# Patient Record
Sex: FEMALE | Race: WHITE | Employment: UNEMPLOYED | URBAN - METROPOLITAN AREA
[De-identification: names, ages, dates, MRNs, and addresses within clinical notes are randomized per-mention and may not be internally consistent; named-entity substitution may affect disease eponyms.]

---

## 2017-02-20 ENCOUNTER — DOCUMENTATION ONLY (OUTPATIENT)
Dept: NEUROLOGY | Age: 57
End: 2017-02-20

## 2017-02-20 NOTE — PROGRESS NOTES
Received PA request from [employer] for Fycompa 2 mg tab. PA request faxed to [facility]. Fax confirmed.

## 2017-03-07 RX ORDER — CARBAMAZEPINE 300 MG/1
CAPSULE, EXTENDED RELEASE ORAL
Qty: 180 CAP | Refills: 4 | Status: SHIPPED | OUTPATIENT
Start: 2017-03-07 | End: 2017-07-25 | Stop reason: SDUPTHER

## 2017-03-20 ENCOUNTER — TELEPHONE (OUTPATIENT)
Dept: NEUROLOGY | Age: 57
End: 2017-03-20

## 2017-03-20 NOTE — TELEPHONE ENCOUNTER
Pt calling in re to a [facility] got [facility] re to her [facility]. Please give her a call back.

## 2017-03-20 NOTE — TELEPHONE ENCOUNTER
Spoke with patient. She stated that she received a letter from [facility] stating that Sheral Payer is not covered. Patient wanted to know what she should do about this. Writer told patient, will reach out to PA coordinator to help with this. Patient stated she is doing really well on Fycompa and does not want it taken away.

## 2017-03-22 ENCOUNTER — TELEPHONE (OUTPATIENT)
Dept: NEUROLOGY | Age: 57
End: 2017-03-22

## 2017-03-23 NOTE — TELEPHONE ENCOUNTER
Pt calling again in re to check the status of her PA. Pt she said she has until today to get her medication Fycompa. Pt was very upset because she is worried it will not be filled before she runs out. Please give her a call back as soon as possible.

## 2017-03-23 NOTE — TELEPHONE ENCOUNTER
Spoke with patient. She raised her voice again at this nurse and stated \"Latrice will not be there tomorrow so I need something done today\". Writer asked if she spoke with Tomy Chandler and she stated that she had not but she \"called her phone\" and was told that she Dyana Mata) was not going to be in the office tomorrow. Writer again stated to the patient that I have reached out to Tomy Chandler to look into this issue but I do not have an update at this time. Patient then stated \"Okay, I'll just keep calling Latrice then\" and ended the phone conversation.

## 2017-03-23 NOTE — TELEPHONE ENCOUNTER
Spoke with patient. She raised her voice and stated \"the pharmacist will not be giving me anymore Fycompa after Friday and I'm running out\". Writer advised that I have reached out to our PA coordinator but have not received an update as of yet. Writer told patient, will reach out again and will call patient with any updates. She stated understanding.

## 2017-03-31 ENCOUNTER — OFFICE VISIT (OUTPATIENT)
Dept: NEUROLOGY | Age: 57
End: 2017-03-31

## 2017-03-31 ENCOUNTER — HOSPITAL ENCOUNTER (EMERGENCY)
Age: 57
Discharge: HOME | End: 2017-03-31
Attending: EMERGENCY MEDICINE | Admitting: EMERGENCY MEDICINE
Payer: MEDICARE

## 2017-03-31 VITALS
SYSTOLIC BLOOD PRESSURE: 180 MMHG | OXYGEN SATURATION: 97 % | RESPIRATION RATE: 18 BRPM | WEIGHT: 171 LBS | HEART RATE: 95 BPM | BODY MASS INDEX: 28.49 KG/M2 | HEIGHT: 65 IN | DIASTOLIC BLOOD PRESSURE: 96 MMHG

## 2017-03-31 VITALS
OXYGEN SATURATION: 97 % | WEIGHT: 167.13 LBS | RESPIRATION RATE: 16 BRPM | TEMPERATURE: 98.9 F | DIASTOLIC BLOOD PRESSURE: 71 MMHG | HEIGHT: 65 IN | BODY MASS INDEX: 27.85 KG/M2 | SYSTOLIC BLOOD PRESSURE: 177 MMHG | HEART RATE: 95 BPM

## 2017-03-31 DIAGNOSIS — R41.89 COGNITIVE IMPAIRMENT: ICD-10-CM

## 2017-03-31 DIAGNOSIS — G40.219 LOCALIZATION-RELATED SYMPTOMATIC EPILEPSY AND EPILEPTIC SYNDROMES WITH COMPLEX PARTIAL SEIZURES, INTRACTABLE, WITHOUT STATUS EPILEPTICUS (HCC): Primary | ICD-10-CM

## 2017-03-31 DIAGNOSIS — I10 ESSENTIAL HYPERTENSION: Primary | ICD-10-CM

## 2017-03-31 LAB
ALBUMIN SERPL BCP-MCNC: 3.8 G/DL (ref 3.5–5)
ALBUMIN/GLOB SERPL: 1 {RATIO} (ref 1.1–2.2)
ALP SERPL-CCNC: 101 U/L (ref 45–117)
ALT SERPL-CCNC: 25 U/L (ref 12–78)
ANION GAP BLD CALC-SCNC: 10 MMOL/L (ref 5–15)
APPEARANCE UR: CLEAR
AST SERPL W P-5'-P-CCNC: 8 U/L (ref 15–37)
ATRIAL RATE: 95 BPM
BACTERIA URNS QL MICRO: NEGATIVE /HPF
BASOPHILS # BLD AUTO: 0 K/UL (ref 0–0.1)
BASOPHILS # BLD: 0 % (ref 0–1)
BILIRUB SERPL-MCNC: 0.2 MG/DL (ref 0.2–1)
BILIRUB UR QL: NEGATIVE
BUN SERPL-MCNC: 15 MG/DL (ref 6–20)
BUN/CREAT SERPL: 23 (ref 12–20)
CALCIUM SERPL-MCNC: 9 MG/DL (ref 8.5–10.1)
CALCULATED P AXIS, ECG09: 51 DEGREES
CALCULATED R AXIS, ECG10: -23 DEGREES
CALCULATED T AXIS, ECG11: 86 DEGREES
CHLORIDE SERPL-SCNC: 99 MMOL/L (ref 97–108)
CO2 SERPL-SCNC: 30 MMOL/L (ref 21–32)
COLOR UR: ABNORMAL
CREAT SERPL-MCNC: 0.66 MG/DL (ref 0.55–1.02)
DIAGNOSIS, 93000: NORMAL
EOSINOPHIL # BLD: 0.1 K/UL (ref 0–0.4)
EOSINOPHIL NFR BLD: 2 % (ref 0–7)
EPITH CASTS URNS QL MICRO: ABNORMAL /LPF
ERYTHROCYTE [DISTWIDTH] IN BLOOD BY AUTOMATED COUNT: 12.3 % (ref 11.5–14.5)
GLOBULIN SER CALC-MCNC: 4 G/DL (ref 2–4)
GLUCOSE SERPL-MCNC: 245 MG/DL (ref 65–100)
GLUCOSE UR STRIP.AUTO-MCNC: >1000 MG/DL
HCT VFR BLD AUTO: 42.3 % (ref 35–47)
HGB BLD-MCNC: 14.2 G/DL (ref 11.5–16)
HGB UR QL STRIP: NEGATIVE
HYALINE CASTS URNS QL MICRO: ABNORMAL /LPF (ref 0–5)
KETONES UR QL STRIP.AUTO: NEGATIVE MG/DL
LEUKOCYTE ESTERASE UR QL STRIP.AUTO: NEGATIVE
LYMPHOCYTES # BLD AUTO: 24 % (ref 12–49)
LYMPHOCYTES # BLD: 2 K/UL (ref 0.8–3.5)
MCH RBC QN AUTO: 29.5 PG (ref 26–34)
MCHC RBC AUTO-ENTMCNC: 33.6 G/DL (ref 30–36.5)
MCV RBC AUTO: 87.9 FL (ref 80–99)
MONOCYTES # BLD: 0.6 K/UL (ref 0–1)
MONOCYTES NFR BLD AUTO: 7 % (ref 5–13)
NEUTS SEG # BLD: 5.6 K/UL (ref 1.8–8)
NEUTS SEG NFR BLD AUTO: 67 % (ref 32–75)
NITRITE UR QL STRIP.AUTO: NEGATIVE
P-R INTERVAL, ECG05: 166 MS
PH UR STRIP: 5.5 [PH] (ref 5–8)
PLATELET # BLD AUTO: 341 K/UL (ref 150–400)
POTASSIUM SERPL-SCNC: 3.8 MMOL/L (ref 3.5–5.1)
PROT SERPL-MCNC: 7.8 G/DL (ref 6.4–8.2)
PROT UR STRIP-MCNC: ABNORMAL MG/DL
Q-T INTERVAL, ECG07: 362 MS
QRS DURATION, ECG06: 104 MS
QTC CALCULATION (BEZET), ECG08: 454 MS
RBC # BLD AUTO: 4.81 M/UL (ref 3.8–5.2)
RBC #/AREA URNS HPF: ABNORMAL /HPF (ref 0–5)
SODIUM SERPL-SCNC: 139 MMOL/L (ref 136–145)
SP GR UR REFRACTOMETRY: 1.02 (ref 1–1.03)
UROBILINOGEN UR QL STRIP.AUTO: 0.2 EU/DL (ref 0.2–1)
VENTRICULAR RATE, ECG03: 95 BPM
WBC # BLD AUTO: 8.4 K/UL (ref 3.6–11)
WBC URNS QL MICRO: ABNORMAL /HPF (ref 0–4)

## 2017-03-31 PROCEDURE — 80053 COMPREHEN METABOLIC PANEL: CPT | Performed by: EMERGENCY MEDICINE

## 2017-03-31 PROCEDURE — 36415 COLL VENOUS BLD VENIPUNCTURE: CPT | Performed by: EMERGENCY MEDICINE

## 2017-03-31 PROCEDURE — 99285 EMERGENCY DEPT VISIT HI MDM: CPT

## 2017-03-31 PROCEDURE — 81001 URINALYSIS AUTO W/SCOPE: CPT | Performed by: EMERGENCY MEDICINE

## 2017-03-31 PROCEDURE — 85025 COMPLETE CBC W/AUTO DIFF WBC: CPT | Performed by: EMERGENCY MEDICINE

## 2017-03-31 PROCEDURE — 74011250637 HC RX REV CODE- 250/637: Performed by: EMERGENCY MEDICINE

## 2017-03-31 PROCEDURE — 93005 ELECTROCARDIOGRAM TRACING: CPT

## 2017-03-31 RX ORDER — HYDRALAZINE HYDROCHLORIDE 25 MG/1
25 TABLET, FILM COATED ORAL 2 TIMES DAILY
Qty: 60 TAB | Refills: 0 | Status: SHIPPED | OUTPATIENT
Start: 2017-03-31 | End: 2019-12-02

## 2017-03-31 RX ORDER — HYDRALAZINE HYDROCHLORIDE 50 MG/1
25 TABLET, FILM COATED ORAL
Status: COMPLETED | OUTPATIENT
Start: 2017-03-31 | End: 2017-03-31

## 2017-03-31 RX ADMIN — HYDRALAZINE HYDROCHLORIDE 25 MG: 50 TABLET, FILM COATED ORAL at 18:01

## 2017-03-31 NOTE — PROGRESS NOTES
Neurology Progress Note    HISTORY PROVIDED BY: patient and friend    Chief Complaint:   Chief Complaint   Patient presents with    Seizure      Subjective:   Pt is a 60 y.o. left handed female with focal onset epilepsy with h/o left MCA CVA on 7/17/13 with partial aphasia, right facial weakness and right upper extremity weakness, with persistent difficulties with language. Pt had risk factors of uncontrolled HTN, DM, hypercholesterolemia, and medical noncompliance- all improved. She had new onset seizure 2/21/14, probable focal onset seizure with secondary generalization with left hemisphere onset, and stroke is likely epileptogenic focus. Has also had a cluster of focal onset seizures without DEMAR/LOC. Was having ongoing breakthrough spells that sounded unusual despite escalating doses of AEDs. Now reporting good seizure control since August. Exam with mild expressive aphasia. Continued Carbamazepine /900mg and Fycompa 8mg every day. Consider EMU evaluation if has recurrent breakthrough seizures. Continued Effexor XR 75mg every day. Continued Plavix 75mg every day. Recommended evaluation by her ophthalmologist for monocular diplopia. She returns for f/u. She denies any seizures since she called the clinic in January reporting one breakthrough seizure in setting of stress and sleep deprivation. AEDs have not changed. Her friend and pt note that she has been sweating excessively recently, denies CP or SOB. BP today is very elevated. Reports compliance with her meds. Has not discussed excessive sweating with her PCP. She saw her ophthalmologist and is being treated for dry eye, may need a prism.          Past Medical History:   Diagnosis Date    Anxiety     anxiety    [facility]     Hypercholesterolemia     Hypertension     Localization-related symptomatic epilepsy and epileptic syndromes with complex partial seizures, intractable, without status epilepticus ([facility]     Stroke ([facility]     Left MCA HARRIETT 2013      Past Surgical History:   Procedure Laterality Date    HX  SECTION      x 3    HX GYN      2 masses removed from ovaries, noncancerous    HX HERNIA REPAIR      HX HYSTERECTOMY        Social History     Social History    Marital status:      Spouse name: N/A    Number of children: N/A    Years of education: N/A     Occupational History    Retired      Social History Main Topics    Smoking status: Never Smoker    Smokeless tobacco: Never Used    Alcohol use No    Drug use: No    Sexual activity: Not on file     Other Topics Concern    Not on file     Social History Narrative    Lives with a roommate     Family History   Problem Relation Age of Onset    Adopted: Yes          Objective:   ROS:  Per HPI-  Otherwise 10 point ROS was negative    Allergies   Allergen Reactions    Aspirin Swelling     Periorbital swelling    Codeine Angioedema    Nsaids (Non-Steroidal Anti-Inflammatory Drug) Nausea and Vomiting    Pcn [Penicillins] Swelling       Meds:  Outpatient Medications Prior to Visit   Medication Sig Dispense Refill    carBAMazepine ER (CARBATROL ER) 300 mg capsule TAKE 3 CAPSULES BY MOUTH 2 TIMES A  Cap 4    venlafaxine-SR (EFFEXOR-XR) 75 mg capsule Take 1 Cap by mouth daily. 11    FYCOMPA 2 mg tablet Take 4 Tabs by mouth every evening. Max Daily Amount: 8 mg. 120 Tab 5    verapamil CR (VERELAN) 360 mg CR capsule Take 360 mg by mouth daily.  LORazepam (ATIVAN) 0.5 mg tablet Take 2 Tabs by mouth every eight (8) hours as needed for Anxiety. Max Daily Amount: 3 mg. 15 Tab 0    atorvastatin (LIPITOR) 40 mg tablet Take 40 mg by mouth nightly.  metFORMIN ER (GLUCOPHAGE XR) 500 mg tablet Take 2,000 mg by mouth daily (with dinner).  valsartan-hydrochlorothiazide (DIOVAN-HCT) 320-25 mg per tablet Take 1 Tab by mouth daily. Indications: HYPERTENSION 30 Tab 0    clopidogrel (PLAVIX) 75 mg tablet Take 1 Tab by mouth daily.  30 Tab 1    insulin glargine (LANTUS SOLOSTAR) 100 unit/mL (3 mL) pen 15 Units by SubCUTAneous route nightly. No facility-administered medications prior to visit. Imaging:  MRI Results (most recent):    Results from Hospital Encounter encounter on 02/21/14   MRA BRAIN WO CONT   Narrative **Final Report**      ICD Codes / Adm. Diagnosis: 435.9  [id number] / Unspecified transient cerebral    Urinary Incontinence  Examination:  MR HEAD MRA WO CON  - [id number] - Feb 21 2014  2:34PM  Accession No:  [id number]  Reason:        REPORT:  INDICATION:  Right facial droop    EXAMINATION:  MRI BRAIN WITH/WITHOUT CONTRAST, MRA HEAD WO CONTRAST    COMPARISON:  CT head earlier today    TECHNIQUE:  MR imaging of the brain was performed with sagittal T1, axial   T1, T2, FLAIR, GRE, DWI/ADC; pre and post contrast multiplanar T1 utilizing   7 mL Gadavist.  3 D time of flight MRA of the head was performed. FINDINGS:    The ventricles are midline without hydrocephalus. There is no acute intra   or extra-axial fluid collection. There is encephalomalacia in the anterior   left middle cerebral artery territory, with associated left sided while area   and degeneration. There is a punctate remote lacunar infarction within the   left cerebellum. There is no acute infarction. The major intracranial   vascular flow-voids are patent. There is no abnormal parenchymal or   meningeal enhancement. Basilar artery is diminutive due to bilateral posterior communicating   arteries. There is mild irregularity of the bilateral posterior cerebral   arteries. Internal carotid arteries are patent. There is focal irregularity   and narrowing at the left distal M1 segment, with minimal flow in the   anterior division of the left middle cerebral artery ranges. There is also   mild irregularity throughout the bilateral anterior cerebral arteries. There   is no aneurysm.        IMPRESSION:  Encephalomalacia anterior left middle cerebral artery territory with   associated left sided Wallerian degeneration. Diminished flow anterior   division left MCA branches, felt to be chronic. No acute infarction. Signing/Reading Doctor: Maribell Burgos ([id number])    Approved: Maribell Burgos ([id number])  Feb 21 2014  3:35PM                                    CT Results (most recent):    Results from Hospital Encounter encounter on 01/04/15   CT CODE NEURO HEAD WO CONTRAST   Narrative **Final Report**      ICD Codes / Adm. Diagnosis: [phone] / Stroke  STROKE  Examination:  CT CODE NEURO HEAD WO CON  - [id number] - Jan 4 2015  9:08PM  Accession No:  [id number]  Reason:  aphasia, right droop      REPORT:  INDICATION: Stroke. Aphasia. FINDINGS: 5 mm axial images were obtained from the skull base through the   vertex. The ventricles and cortical sulci are appropriate in size and   configuration. There is stable encephalomalacia in the left frontal lobe. There is no evidence of intracranial hemorrhage, mass, mass effect, or acute   infarct. No extra-axial fluid collections are seen. The visualized paranasal   sinuses and mastoid air cells are clear. The orbital structures are   unremarkable. No osseous abnormalities are seen. IMPRESSION: Stable left frontal lobe encephalomalacia, compatible with old   infarct. No evidence of acute infarct, intracranial hemorrhage, or   intracranial mass. Signing/Reading Doctor: Palma Pozo ([id number])    Approved: Palma Pozo ([id number])  Jan 4 2015  9:19PM                                      Reviewed records in [facility] and media tab today    Lab Review   Results for orders placed or performed in visit on 10/03/16   CARBAMAZEPINE   Result Value Ref Range    Carbamazepine 11.8 4.0 - 12.0 ug/mL        Exam:  Visit Vitals    BP (!) 180/96    Pulse 95    Resp 18    Ht 5' 5\" (1.651 m)    Wt 77.6 kg (171 lb)    SpO2 97%    BMI 28.46 kg/m2   BP recheck was 180/100. General:  Alert, cooperative, no distress. Diaphoretic. Tremulous. Head:  Normocephalic, without obvious abnormality, atraumatic. Respiratory:  Heart:   Non labored breathing  Regular rhythm, tachycardic,  no murmurs       Extremities: Warm, no cyanosis or edema. Pulses: 2+ radial pulses. Neurologic:  MS: Alert and oriented x 4, speech intact. Language - word finding. Attention and fund of knowledge appropriate. Recent and remote memory intact. Cranial Nerves:  II: visual fields Full to confrontation   II: pupils Equal, round, reactive to light   II: optic disc    III,VII: ptosis none   III,IV,VI: extraocular muscles  EOMI, no nystagmus or diplopia   V: facial light touch sensation     VII: facial muscle function   symmetric   VIII: hearing intact   IX: soft palate elevation  normal   XI: trapezius strength     XI: sternocleidomastoid strength    XII: tongue  Midline     Motor: normal bulk and tone, no tremor              Strength: 5/5 throughout, no PD    Coordination: FTN and MARIA GUADALUPE intact  Gait: normal gait  Reflexes: 2+ symmetric           Assessment/Plan   Pt is a 60 y.o. left handed female with focal onset epilepsy with h/o left MCA CVA on 7/17/13 with partial aphasia, right facial weakness and right upper extremity weakness, with persistent difficulties with language. Pt had risk factors of uncontrolled HTN, DM, hypercholesterolemia, and medical noncompliance, now compliant with meds. She had new onset seizure 2/21/14, probable focal onset seizure with secondary generalization with left hemisphere onset, and stroke is likely epileptogenic focus. Has also had a cluster of focal onset seizures without DEMAR/LOC. Seizures have been well controlled since August. Exam with elevated /100 and diaphoresis, mild expressive aphasia. Continue Carbamazepine /900mg and Fycompa 8mg every day. Continue Effexor XR 75mg every day. Continue Plavix 75mg every day.   Pt called her PCP regarding BP to see if she could be seen this afternoon, given history of CVA and multiple CV risk factors. They recommended she go to the ED now. She will f/u in clinic in six months, instructed to call in the interim if needed. ICD-10-CM ICD-9-CM    1. Localization-related symptomatic epilepsy and epileptic syndromes with complex partial seizures, intractable, without status epilepticus [facility] B02.199 341.01 METABOLIC PANEL, COMPREHENSIVE      CBC WITH AUTOMATED DIFF       Signed:   Amanda Gomes MD  3/31/2017

## 2017-03-31 NOTE — ED TRIAGE NOTES
Pt states that she went to her Neurologist for her three month appointment, they told her BP was elevated 180/100, pt was told to call her PCP, which she did and they instructed her to come to the ER. Pt denies chest pain and SOB. Pt states that she does have a HX of HTN and takes medication for it and has been taking it normally. Pt denies NVD.

## 2017-03-31 NOTE — DISCHARGE INSTRUCTIONS
High Blood Pressure: Care Instructions  Your Care Instructions  If your blood pressure is usually above 140/90, you have high blood pressure, or hypertension. That means the top number is 140 or higher or the bottom number is 90 or higher, or both. Despite what a lot of people think, high blood pressure usually doesn't cause headaches or make you feel dizzy or lightheaded. It usually has no symptoms. But it does increase your risk for heart attack, stroke, and kidney or eye damage. The higher your blood pressure, the more your risk increases. Your doctor will give you a goal for your blood pressure. Your goal will be based on your health and your age. An example of a goal is to keep your blood pressure below 140/90. Lifestyle changes, such as eating healthy and being active, are always important to help lower blood pressure. You might also take medicine to reach your blood pressure goal.  Follow-up care is a key part of your treatment and safety. Be sure to make and go to all appointments, and call your doctor if you are having problems. It's also a good idea to know your test results and keep a list of the medicines you take. How can you care for yourself at home? Medical treatment  · If you stop taking your medicine, your blood pressure will go back up. You may take one or more types of medicine to lower your blood pressure. Be safe with medicines. Take your medicine exactly as prescribed. Call your doctor if you think you are having a problem with your medicine. · Talk to your doctor before you start taking aspirin every day. Aspirin can help certain people lower their risk of a heart attack or stroke. But taking aspirin isn't right for everyone, because it can cause serious bleeding. · See your doctor regularly. You may need to see the doctor more often at first or until your blood pressure comes down.   · If you are taking blood pressure medicine, talk to your doctor before you take decongestants or anti-inflammatory medicine, such as ibuprofen. Some of these medicines can raise blood pressure. · Learn how to check your blood pressure at home. Lifestyle changes  · Stay at a healthy weight. This is especially important if you put on weight around the waist. Losing even 10 pounds can help you lower your blood pressure. · If your doctor recommends it, get more exercise. Walking is a good choice. Bit by bit, increase the amount you walk every day. Try for at least 30 minutes on most days of the week. You also may want to swim, bike, or do other activities. · Avoid or limit alcohol. Talk to your doctor about whether you can drink any alcohol. · Try to limit how much sodium you eat to less than 2,300 milligrams (mg) a day. Your doctor may ask you to try to eat less than 1,500 mg a day. · Eat plenty of fruits (such as bananas and oranges), vegetables, legumes, whole grains, and low-fat dairy products. · Lower the amount of saturated fat in your diet. Saturated fat is found in animal products such as milk, cheese, and meat. Limiting these foods may help you lose weight and also lower your risk for heart disease. · Do not smoke. Smoking increases your risk for heart attack and stroke. If you need help quitting, talk to your doctor about stop-smoking programs and medicines. These can increase your chances of quitting for good. When should you call for help? Call 911 anytime you think you may need emergency care. This may mean having symptoms that suggest that your blood pressure is causing a serious heart or blood vessel problem. Your blood pressure may be over 180/110. For example, call 911 if:  · You have symptoms of a heart attack. These may include:  ¨ Chest pain or pressure, or a strange feeling in the chest.  ¨ Sweating. ¨ Shortness of breath. ¨ Nausea or vomiting. ¨ Pain, pressure, or a strange feeling in the back, neck, jaw, or upper belly or in one or both shoulders or arms.   ¨ Lightheadedness or sudden weakness. ¨ A fast or irregular heartbeat. · You have symptoms of a stroke. These may include:  ¨ Sudden numbness, tingling, weakness, or loss of movement in your face, arm, or leg, especially on only one side of your body. ¨ Sudden vision changes. ¨ Sudden trouble speaking. ¨ Sudden confusion or trouble understanding simple statements. ¨ Sudden problems with walking or balance. ¨ A sudden, severe headache that is different from past headaches. · You have severe back or belly pain. Do not wait until your blood pressure comes down on its own. Get help right away. Call your doctor now or seek immediate care if:  · Your blood pressure is much higher than normal (such as 180/110 or higher), but you don't have symptoms. · You think high blood pressure is causing symptoms, such as:  ¨ Severe headache. ¨ Blurry vision. Watch closely for changes in your health, and be sure to contact your doctor if:  · Your blood pressure measures 140/90 or higher at least 2 times. That means the top number is 140 or higher or the bottom number is 90 or higher, or both. · You think you may be having side effects from your blood pressure medicine. · Your blood pressure is usually normal, but it goes above normal at least 2 times. Where can you learn more? Go to http://hansel-jose carlos.info/. Enter G879 in the search box to learn more about \"High Blood Pressure: Care Instructions. \"  Current as of: August 8, 2016  Content Version: 11.2  © 9387-3995 [employer]. Care instructions adapted under license by [employer] (which disclaims liability or warranty for this information). If you have questions about a medical condition or this instruction, always ask your healthcare professional. [place] [address] any warranty or liability for your use of this information. We hope that we have addressed all of your medical concerns.  The examination and treatment you received in the Emergency Department were for an emergent problem and were not intended as complete care. It is important that you follow up with your healthcare provider(s) for ongoing care. If your symptoms worsen or do not improve as expected, and you are unable to reach your usual health care provider(s), you should return to the Emergency Department. Today's healthcare is undergoing tremendous change, and patient satisfaction surveys are one of the many tools to assess the quality of medical care. You may receive a survey from the [employer] regarding your experience in the Emergency Department. I hope that your experience has been completely positive, particularly the medical care that I provided. As such, please participate in the survey; anything less than excellent does not meet my expectations or intentions. [facility] and [facility] participate in nationally recognized quality of care measures. If your blood pressure is greater than 120/80, as reported below, we urge that you seek medical care to address the potential of high blood pressure, commonly known as hypertension. Hypertension can be hereditary or can be caused by certain medical conditions, pain, stress, or \"white coat syndrome. \"       Please make an appointment with your health care provider(s) for follow up of your Emergency Department visit. VITALS:   Patient Vitals for the past 8 hrs:   Temp Pulse Resp BP SpO2   03/31/17 1801 - 84 - 155/77 -   03/31/17 1640 - 93 19 173/76 97 %   03/31/17 1612 - 97 16 (!) 180/92 97 %   03/31/17 1553 98.9 °F (37.2 °C) (!) 102 18 (!) 188/99 97 %          Thank you for allowing us to provide you with medical care today. We realize that you have many choices for your emergency care needs. Please choose us in the future for any continued health care needs. Elías Almanza, [address] [facility], [employer]   Office: [phone]            Recent Results (from the past 24 hour(s))   METABOLIC PANEL, COMPREHENSIVE    Collection Time: 03/31/17  4:52 PM   Result Value Ref Range    Sodium 139 136 - 145 mmol/L    Potassium 3.8 3.5 - 5.1 mmol/L    Chloride 99 97 - 108 mmol/L    CO2 30 21 - 32 mmol/L    Anion gap 10 5 - 15 mmol/L    Glucose 245 (H) 65 - 100 mg/dL    BUN 15 6 - 20 MG/DL    Creatinine 0.66 0.55 - 1.02 MG/DL    BUN/Creatinine ratio 23 (H) 12 - 20      GFR est AA >60 >60 ml/min/1.73m2    GFR est non-AA >60 >60 ml/min/1.73m2    Calcium 9.0 8.5 - 10.1 MG/DL    Bilirubin, total 0.2 0.2 - 1.0 MG/DL    ALT (SGPT) 25 12 - 78 U/L    AST (SGOT) 8 (L) 15 - 37 U/L    Alk. phosphatase 101 45 - 117 U/L    Protein, total 7.8 6.4 - 8.2 g/dL    Albumin 3.8 3.5 - 5.0 g/dL    Globulin 4.0 2.0 - 4.0 g/dL    A-G Ratio 1.0 (L) 1.1 - 2.2     CBC WITH AUTOMATED DIFF    Collection Time: 03/31/17  4:52 PM   Result Value Ref Range    WBC 8.4 3.6 - 11.0 K/uL    RBC 4.81 3.80 - 5.20 M/uL    HGB 14.2 11.5 - 16.0 g/dL    HCT 42.3 35.0 - 47.0 %    MCV 87.9 80.0 - 99.0 FL    MCH 29.5 26.0 - 34.0 PG    MCHC 33.6 30.0 - 36.5 g/dL    RDW 12.3 11.5 - 14.5 %    PLATELET 078 597 - 781 K/uL    NEUTROPHILS 67 32 - 75 %    LYMPHOCYTES 24 12 - 49 %    MONOCYTES 7 5 - 13 %    EOSINOPHILS 2 0 - 7 %    BASOPHILS 0 0 - 1 %    ABS. NEUTROPHILS 5.6 1.8 - 8.0 K/UL    ABS. LYMPHOCYTES 2.0 0.8 - 3.5 K/UL    ABS. MONOCYTES 0.6 0.0 - 1.0 K/UL    ABS. EOSINOPHILS 0.1 0.0 - 0.4 K/UL    ABS.  BASOPHILS 0.0 0.0 - 0.1 K/UL   URINALYSIS W/MICROSCOPIC    Collection Time: 03/31/17  4:52 PM   Result Value Ref Range    Color YELLOW/STRAW      Appearance CLEAR CLEAR      Specific gravity 1.020 1.003 - 1.030      pH (UA) 5.5 5.0 - 8.0      Protein TRACE (A) NEG mg/dL    Glucose >1000 (A) NEG mg/dL    Ketone NEGATIVE  NEG mg/dL    Bilirubin NEGATIVE  NEG      Blood NEGATIVE  NEG      Urobilinogen 0.2 0.2 - 1.0 EU/dL    Nitrites NEGATIVE  NEG      Leukocyte Esterase NEGATIVE  NEG      WBC 0-4 0 - 4 /hpf    RBC 0-5 0 - 5 /hpf    Epithelial cells FEW FEW /lpf    Bacteria NEGATIVE  NEG /hpf    Hyaline cast 0-2 0 - 5 /lpf   EKG, 12 LEAD, INITIAL    Collection Time: 03/31/17  4:52 PM   Result Value Ref Range    Ventricular Rate 95 BPM    Atrial Rate 95 BPM    P-R Interval 166 ms    QRS Duration 104 ms    Q-T Interval 362 ms    QTC Calculation (Bezet) 454 ms    Calculated P Axis 51 degrees    Calculated R Axis -23 degrees    Calculated T Axis 86 degrees    Diagnosis       Normal sinus rhythm  When compared with ECG of 14-JUL-2015 15:20,  Inverted T waves have replaced nonspecific T wave abnormality in Lateral   leads         No results found.

## 2017-03-31 NOTE — ED PROVIDER NOTES
HPI Comments: 60 y.o. 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 Per relative, the pt was seen at her Neurologist's office today (3/31/2017) for her three month appointment where she was informed that her BP was elevated (180/100). The relative states the pt was told to contact her PCP for further evaluation/treatment. Upon calling her PCP, the pt states she was instructed to be seen in the ER for her hypertension. The pt makes it known that she is worried about her blood pressure as she has hx of a stroke about three years ago (resulted in mild speech difficulty as well as significant short term memory loss). While in the ED the pt states she has been taking her BP medication as prescribed (Valsartan and Verapamil). When asked when she last checked her BP at home she states she is unsure. The pt makes it known that she usually sees her PCP every 6-12 months, adding that her last BP check was most likely 6 months ago. Per relative, the pt has been very diaphoretic, however the pt states it is not a new symptoms and has been ongoing for some time. The pt denies dizziness, headache, visual disturbances, CP, abd pain, weight gain and appetite change. There are no other acute medical concerns at this time. Social hx: Non-smoker, No ETOH use    PCP: Cinda Kocher, MD    Note written by Jessica Juarez, as dictated by Mendel Bellis, MD 4:29 PM          The history is provided by the patient and a relative.         Past Medical History:   Diagnosis Date    Anxiety     anxiety    Diabetes ([facility]     Hypercholesterolemia     Hypertension     Localization-related symptomatic epilepsy and epileptic syndromes with complex partial seizures, intractable, without status epilepticus ([facility]     Stroke [facility])     Left MCA CVA 2013       Past Surgical History:   Procedure Laterality Date    HX  SECTION      x 3    HX GYN      2 masses removed from ovaries, noncancerous    HX HERNIA REPAIR      HX HYSTERECTOMY           Family History:   Problem Relation Age of Onset    Adopted: Yes       Social History     Social History    Marital status:      Spouse name: N/A    Number of children: N/A    Years of education: N/A     Occupational History    Retired      Social History Main Topics    Smoking status: Never Smoker    Smokeless tobacco: Never Used    Alcohol use No    Drug use: No    Sexual activity: Not on file     Other Topics Concern    Not on file     Social History Narrative    Lives with a roommate         ALLERGIES: Aspirin; Codeine; Nsaids (non-steroidal anti-inflammatory drug); and Pcn [penicillins]    Review of Systems   Constitutional: Negative for appetite change, chills, fever and unexpected weight change. HENT: Negative for congestion. Eyes: Negative for visual disturbance. Respiratory: Negative for cough, chest tightness, shortness of breath and wheezing. Cardiovascular: Negative for chest pain. Gastrointestinal: Negative for abdominal pain, diarrhea and vomiting. Genitourinary: Negative for dysuria and frequency. Musculoskeletal: Negative for joint swelling. Skin: Negative for rash. Neurological: Negative for dizziness, speech difficulty and headaches. All other systems reviewed and are negative. Vitals:    17 1553   BP: (!) 188/99   Pulse: (!) 102   Resp: 18   Temp: 98.9 °F (37.2 °C)   SpO2: 97%   Weight: 75.8 kg (167 lb 2 oz)   Height: 5' 5\" (1.651 m)            Physical Exam   Constitutional: She is oriented to person, place, and time. She appears well-developed and well-nourished. No distress. HENT:   Head: Normocephalic and atraumatic.    Nose: Nose normal.   Eyes: Conjunctivae are normal. Pupils are equal, round, and reactive to light. No scleral icterus. Neck: Normal range of motion. Neck supple. No JVD present. No tracheal deviation present. No thyromegaly present. Cardiovascular: Normal rate, regular rhythm and normal heart sounds. No murmur heard. Pulmonary/Chest: Effort normal and breath sounds normal. No respiratory distress. She has no wheezes. She has no rales. Abdominal: Soft. Bowel sounds are normal. She exhibits no mass. There is no tenderness. There is no rebound and no guarding. Musculoskeletal: Normal range of motion. She exhibits no edema. Neurological: She is alert and oriented to person, place, and time. No cranial nerve deficit. Coordination normal.   Skin: Skin is warm and dry. No rash noted. She is not diaphoretic. No erythema. Psychiatric: Her behavior is normal.   Pt noted to have moderate short term memory loss related to stroke     Nursing note and vitals reviewed.      Note written by Jessica Nolen, as dictated by Rani Herman MD 4:29 PM    [facility]  ED Course       Procedures    ED EKG interpretation:  Rhythm: normal sinus rhythm;  Rate (approx.): 95 bpm; Axis: normal; ST/T wave: normal    Note written by Jessica Nolen, as dictated by Rani Herman MD 4:52 PM

## 2017-03-31 NOTE — MR AVS SNAPSHOT
Visit Information Date & Time Provider Department Dept. Phone Encounter #  
 3/31/2017  2:40 PM [facility]es [facility] [address] [phone] Follow-up Instructions Return in about 6 months (around 9/30/2017). Upcoming Health Maintenance Date Due Hepatitis C Screening 1960 FOOT EXAM Q1 9/12/1968 MICROALBUMIN Q1 9/12/1968 EYE EXAM RETINAL OR DILATED Q1 9/12/1968 Pneumococcal 19-64 Medium Risk (1 of 1 - PPSV23) 9/12/1977 DTaP/Tdap/Td series (1 - Tdap) 9/12/1979 PAP AKA CERVICAL CYTOLOGY 9/12/1979 BREAST CANCER SCRN MAMMOGRAM 9/12/2008 FOBT Q 1 YEAR AGE 50-75 9/12/2008 HEMOGLOBIN A1C Q6M 7/5/2015 LIPID PANEL Q1 1/5/2016 INFLUENZA AGE 9 TO ADULT 8/1/2016 Allergies as of 3/31/2017  Review Complete On: 3/31/2017 By: Georges Chambers MD  
  
 Severity Noted Reaction Type Reactions Aspirin  02/21/2014   Systemic Swelling Periorbital swelling Codeine  07/14/2013    Angioedema Nsaids (Non-steroidal Anti-inflammatory Drug)  07/14/2013    Nausea and Vomiting Pcn [Penicillins]  07/14/2013    Swelling Current Immunizations  Reviewed on 1/6/2015 No immunizations on file. Not reviewed this visit You Were Diagnosed With   
  
 Codes Comments Localization-related symptomatic epilepsy and epileptic syndromes with complex partial seizures, intractable, without status epilepticus ([facility]    -  Primary ICD-10-CM: C67.157 ICD-9-CM: 345.41 Vitals BP Pulse Resp Height(growth percentile) Weight(growth percentile) SpO2  
 (!) 180/96 95 18 5' 5\" (1.651 m) 171 lb (77.6 kg) 97% BMI OB Status Smoking Status 28.46 kg/m2 Postmenopausal Never Smoker Vitals History BMI and BSA Data Body Mass Index Body Surface Area  
 28.46 kg/m 2 1.89 m 2 Preferred Pharmacy Pharmacy Name Phone [id number]- [place], [place] - [address] [facility] AT [address] [place] [address] [phone] Your Updated Medication List  
  
   
This list is accurate as of: 3/31/17  3:31 PM.  Always use your most recent med list.  
  
  
  
  
 carBAMazepine  mg capsule Commonly known as:  CARBATROL ER  
TAKE 3 CAPSULES BY MOUTH 2 TIMES A DAY  
  
 clopidogrel 75 mg Tab Commonly known as:  PLAVIX Take 1 Tab by mouth daily. FYCOMPA 2 mg tablet Generic drug:  perampanel Take 4 Tabs by mouth every evening. Max Daily Amount: 8 mg.  
  
 glucophage  mg tablet Generic drug:  metFORMIN ER Take 2,000 mg by mouth daily (with dinner). LIPITOR 40 mg tablet Generic drug:  atorvastatin Take 40 mg by mouth nightly. LORazepam 0.5 mg tablet Commonly known as:  ATIVAN Take 2 Tabs by mouth every eight (8) hours as needed for Anxiety. Max Daily Amount: 3 mg.  
  
 valsartan-hydroCHLOROthiazide 320-25 mg per tablet Commonly known as:  DIOVAN-HCT Take 1 Tab by mouth daily. Indications: HYPERTENSION  
  
 venlafaxine-SR 75 mg capsule Commonly known as:  EFFEXOR-XR Take 1 Cap by mouth daily. verapamil  mg ER capsule Commonly known as:  Sotero Kane Take 360 mg by mouth daily. We Performed the Following CBC WITH AUTOMATED DIFF [02039 CPT(R)] METABOLIC PANEL, COMPREHENSIVE [92788 CPT(R)] Follow-up Instructions Return in about 6 months (around 9/30/2017). Patient Instructions Thank you for choosing [facility] and [facility] for your  
 
care. You may receive a survey about your visit. We appreciate you taking time  
 
to complete this survey as we use your feedback to improve our services. We  
 
realize we are not perfect, but we strive to provide excellent care. PRESCRIPTION REFILL POLICY [facility] Statement to Patients April 1, 2014 In an effort to ensure the large volume of patient prescription refills is processed in the most efficient and expeditious manner, we are asking our patients to assist us by calling your Pharmacy for all prescription refills, this will include also your  Mail Order Pharmacy. The pharmacy will contact our office electronically to continue the refill process. Please do not wait until the last minute to call your pharmacy. We need at least 48 hours (2days) to fill prescriptions. We also encourage you to call your pharmacy before going to  your prescription to make sure it is ready. With regard to controlled substance prescription refill requests (narcotic refills) that need to be picked up at our office, we ask your cooperation by providing us with at least 72 hours (3days) notice that you will need a refill. We will not refill narcotic prescription refill requests after 4:00pm on any weekday, Monday through Thursday, or after 2:00pm on Fridays, or on the weekends. We encourage everyone to explore another way of getting your prescription refill request processed using [employer], our patient web portal through our electronic medical record system. [employer] is an efficient and effective way to communicate your medication request directly to the office and  downloadable as an jean claude on your smart phone . [employer] also features a review functionality that allows you to view your medication list as well as leave messages for your physician. Are you ready to get connected? If so please review the attatched instructions or speak to any of our staff to get you set up right away! Thank you so much for your cooperation. Should you have any questions please contact our Practice Administrator. The Physicians and Staff,  [facility] Introducing [facility] SERVICES!    
 [facility] introduces [employer] patient portal. Now you can access parts of your medical record, email your doctor's office, and request medication refills online. 1. In your internet browser, go to [email or web address] 2. Click on the First Time User? Click Here link in the Sign In box. You will see the New Member Sign Up page. 3. Enter your [employer] Access Code exactly as it appears below. You will not need to use this code after youve completed the sign-up process. If you do not sign up before the expiration date, you must request a new code. · [employer] Access Code: [email or web address] Expires: 6/29/2017  2:25 PM 
 
4. Enter the last four digits of your Social Security Number (xxxx) and Date of Birth (mm/dd/yyyy) as indicated and click Submit. You will be taken to the next sign-up page. 5. Create a Fresh Dish ID. This will be your [employer] D[employer] login ID and cannot be changed, so think of one that is secure and easy to remember. 6. Create a [employer] password. You can change your password at any time. 7. Enter your Password Reset Question and Answer. This can be used at a later time if you forget your password. 8. Enter your e-mail address. You will receive e-mail notification when new information is available in [address] 9. Click Sign Up. You can now view and download portions of your medical record. 10. Click the Download Summary menu link to download a portable copy of your medical information. If you have questions, please visit the Frequently Asked Questions section of the [employer] website. Remember, [employer] is NOT to be used for urgent needs. For medical emergencies, dial 911. Now available from your iPhone and Android! Please provide this summary of care documentation to your next provider. Your primary care clinician is listed as Sekou Dejesus. If you have any questions after today's visit, please call [phone].

## 2017-03-31 NOTE — PATIENT INSTRUCTIONS
Thank you for choosing [employer] and [facility] for your     care. You may receive a survey about your visit. We appreciate you taking time     to complete this survey as we use your feedback to improve our services. We     realize we are not perfect, but we strive to provide excellent care. [facility]   Statement to Patients  April 1, 2014      In an effort to ensure the large volume of patient prescription refills is processed in the most efficient and expeditious manner, we are asking our patients to assist us by calling your Pharmacy for all prescription refills, this will include also your  Mail Order Pharmacy. The pharmacy will contact our office electronically to continue the refill process. Please do not wait until the last minute to call your pharmacy. We need at least 48 hours (2days) to fill prescriptions. We also encourage you to call your pharmacy before going to  your prescription to make sure it is ready. With regard to controlled substance prescription refill requests (narcotic refills) that need to be picked up at our office, we ask your cooperation by providing us with at least 72 hours (3days) notice that you will need a refill. We will not refill narcotic prescription refill requests after 4:00pm on any weekday, Monday through Thursday, or after 2:00pm on Fridays, or on the weekends. We encourage everyone to explore another way of getting your prescription refill request processed using [employer], our patient web portal through our electronic medical record system. [employer] is an efficient and effective way to communicate your medication request directly to the office and  downloadable as an jean claude on your smart phone . [employer] also features a review functionality that allows you to view your medication list as well as leave messages for your physician. Are you ready to get connected?  If so please review the attatched instructions or speak to any of our staff to get you set up right away! Thank you so much for your cooperation. Should you have any questions please contact our Practice Administrator.     The Physicians and Staff,  [facility]

## 2017-04-24 NOTE — TELEPHONE ENCOUNTER
Spoke with patient. She stated that [facility] told her that we refused her Fycompa Rx and she was unsure why and she is running low on her medication. Writer told patient that we have not received anything regarding this Rx so will call [facility] to clarify. Called [facility] and spoke with pharmacy tech. They sent the Rx request to a different fax number and it was refused there (likely Dr. Antionette Dugan former office). Requested to have the refill request faxed to our office.

## 2017-04-24 NOTE — TELEPHONE ENCOUNTER
Pt calling to let Jose Manuel know [facility] sent her prescription to the wrong pharmacy. She said she needs the prescription filled for 6 months.

## 2017-04-24 NOTE — TELEPHONE ENCOUNTER
Pt is calling in re to her Fycompa. She unsure why Dr. Bethany Nageotte said no to her prescription.  Please give her a call back

## 2017-04-24 NOTE — TELEPHONE ENCOUNTER
Spoke with patient and advised will have to get another doctor to approve her refill since Dr. Peng Peter is out of the office this week. Patient was given an opportunity to ask questions, repeated information, and verbalized understanding.

## 2017-04-25 RX ORDER — PERAMPANEL 2 MG/1
8 TABLET ORAL EVERY EVENING
Qty: 120 TAB | Refills: 0 | Status: SHIPPED | OUTPATIENT
Start: 2017-04-25 | End: 2017-05-19 | Stop reason: SDUPTHER

## 2017-05-19 RX ORDER — PERAMPANEL 2 MG/1
8 TABLET ORAL EVERY EVENING
Qty: 120 TAB | Refills: 3 | Status: SHIPPED | OUTPATIENT
Start: 2017-05-19 | End: 2017-09-15 | Stop reason: SDUPTHER

## 2017-06-20 RX ORDER — VENLAFAXINE HYDROCHLORIDE 75 MG/1
75 CAPSULE, EXTENDED RELEASE ORAL DAILY
Qty: 30 CAP | Refills: 1 | Status: SHIPPED | OUTPATIENT
Start: 2017-06-20 | End: 2017-08-21 | Stop reason: SDUPTHER

## 2017-06-20 NOTE — TELEPHONE ENCOUNTER
Pt is out of the medication and stated that she needed a refill today. Requested Prescriptions     Pending Prescriptions Disp Refills    venlafaxine-SR (EFFEXOR-XR) 75 mg capsule  11     Sig: Take 1 Cap by mouth daily.

## 2017-07-25 RX ORDER — CARBAMAZEPINE 300 MG/1
CAPSULE, EXTENDED RELEASE ORAL
Qty: 180 CAP | Refills: 4 | Status: SHIPPED | OUTPATIENT
Start: 2017-07-25 | End: 2017-12-02 | Stop reason: SDUPTHER

## 2017-09-15 RX ORDER — PERAMPANEL 2 MG/1
8 TABLET ORAL EVERY EVENING
Qty: 120 TAB | Refills: 3 | Status: SHIPPED | OUTPATIENT
Start: 2017-09-15 | End: 2017-09-28 | Stop reason: DRUGHIGH

## 2017-09-28 ENCOUNTER — OFFICE VISIT (OUTPATIENT)
Dept: NEUROLOGY | Age: 57
End: 2017-09-28

## 2017-09-28 VITALS
SYSTOLIC BLOOD PRESSURE: 162 MMHG | DIASTOLIC BLOOD PRESSURE: 80 MMHG | OXYGEN SATURATION: 95 % | RESPIRATION RATE: 18 BRPM | HEART RATE: 100 BPM | HEIGHT: 65 IN | WEIGHT: 165 LBS | BODY MASS INDEX: 27.49 KG/M2

## 2017-09-28 DIAGNOSIS — G40.219 LOCALIZATION-RELATED SYMPTOMATIC EPILEPSY AND EPILEPTIC SYNDROMES WITH COMPLEX PARTIAL SEIZURES, INTRACTABLE, WITHOUT STATUS EPILEPTICUS (HCC): Primary | ICD-10-CM

## 2017-09-28 NOTE — MR AVS SNAPSHOT
Visit Information Date & Time Provider Department Dept. Phone Encounter #  
 9/28/2017  3:40 PM Claritza Mccarty MD [facility] at [address] [id number] Follow-up Instructions Return in about 4 months (around 1/28/2018). Upcoming Health Maintenance Date Due Hepatitis C Screening 1960 FOOT EXAM Q1 9/12/1968 MICROALBUMIN Q1 9/12/1968 EYE EXAM RETINAL OR DILATED Q1 9/12/1968 Pneumococcal 19-64 Medium Risk (1 of 1 - PPSV23) 9/12/1977 DTaP/Tdap/Td series (1 - Tdap) 9/12/1979 PAP AKA CERVICAL CYTOLOGY 9/12/1979 BREAST CANCER SCRN MAMMOGRAM 9/12/2008 FOBT Q 1 YEAR AGE 50-75 9/12/2008 HEMOGLOBIN A1C Q6M 7/5/2015 LIPID PANEL Q1 1/5/2016 INFLUENZA AGE 9 TO ADULT 8/1/2017 Allergies as of 9/28/2017  Review Complete On: 9/28/2017 By: Merced Noriega LPN Severity Noted Reaction Type Reactions Aspirin  02/21/2014   Systemic Swelling Periorbital swelling Codeine  07/14/2013    Angioedema Nsaids (Non-steroidal Anti-inflammatory Drug)  07/14/2013    Nausea and Vomiting Pcn [Penicillins]  07/14/2013    Swelling Current Immunizations  Reviewed on 1/6/2015 No immunizations on file. Not reviewed this visit Vitals BP Pulse Resp Height(growth percentile) Weight(growth percentile) SpO2  
 162/80 100 18 5' 5\" (1.651 m) 165 lb (74.8 kg) 95% BMI OB Status Smoking Status 27.46 kg/m2 Postmenopausal Never Smoker Vitals History BMI and BSA Data Body Mass Index Body Surface Area  
 27.46 kg/m 2 1.85 m 2 Preferred Pharmacy Pharmacy Name Phone [id number] [address] AT [address] [place] [address] [phone] Your Updated Medication List  
  
   
This list is accurate as of: 9/28/17  4:26 PM.  Always use your most recent med list.  
  
  
  
  
 carBAMazepine  mg capsule Commonly known as:  CARBATROL ER  
 TAKE 3 CAPSULES BY MOUTH 2 TIMES A DAY  
  
 clopidogrel 75 mg Tab Commonly known as:  PLAVIX Take 1 Tab by mouth daily. glucophage  mg tablet Generic drug:  metFORMIN ER Take 2,000 mg by mouth daily (with dinner). hydrALAZINE 25 mg tablet Commonly known as:  APRESOLINE Take 1 Tab by mouth two (2) times a day. LIPITOR 40 mg tablet Generic drug:  atorvastatin Take 40 mg by mouth nightly. LORazepam 0.5 mg tablet Commonly known as:  ATIVAN Take 2 Tabs by mouth every eight (8) hours as needed for Anxiety. Max Daily Amount: 3 mg.  
  
 perampanel 10 mg Tab Commonly known as:  [employer] Take 1 Tab by mouth daily. Max Daily Amount: 1 Tab.  
  
 valsartan-hydroCHLOROthiazide 320-25 mg per tablet Commonly known as:  DIOVAN-HCT Take 1 Tab by mouth daily. Indications: HYPERTENSION  
  
 venlafaxine-SR 75 mg capsule Commonly known as:  EFFEXOR-XR  
TAKE 1 CAPSULE BY MOUTH DAILY. verapamil  mg ER capsule Commonly known as:  [place] [employer] Take 360 mg by mouth daily. Prescriptions Printed Refills  
 perampanel (FYCOMPA) 10 mg tab 3 Sig: Take 1 Tab by mouth daily. Max Daily Amount: 1 Tab. Class: Print Route: Oral  
  
Follow-up Instructions Return in about 4 months (around 1/28/2018). Patient Instructions PRESCRIPTION REFILL POLICY [facility] Statement to Patients April 1, 2014 In an effort to ensure the large volume of patient prescription refills is processed in the most efficient and expeditious manner, we are asking our patients to assist us by calling your Pharmacy for all prescription refills, this will include also your  Mail Order Pharmacy. The pharmacy will contact our office electronically to continue the refill process. Please do not wait until the last minute to call your pharmacy. We need at least 48 hours (2days) to fill prescriptions.  We also encourage you to call your pharmacy before going to  your prescription to make sure it is ready. With regard to controlled substance prescription refill requests (narcotic refills) that need to be picked up at our office, we ask your cooperation by providing us with at least 72 hours (3days) notice that you will need a refill. We will not refill narcotic prescription refill requests after 4:00pm on any weekday, Monday through Thursday, or after 2:00pm on Fridays, or on the weekends. We encourage everyone to explore another way of getting your prescription refill request processed using [employer], our patient web portal through our electronic medical record system. [employer] is an efficient and effective way to communicate your medication request directly to the office and  downloadable as an jean claude on your smart phone . [employer] also features a review functionality that allows you to view your medication list as well as leave messages for your physician. Are you ready to get connected? If so please review the attatched instructions or speak to any of our staff to get you set up right away! Thank you so much for your cooperation. Should you have any questions please contact our Practice Administrator. The Physicians and Staff,  [facility] Please bring all medication bottles, including vitamins, supplements and any over-the-counter medications, to your next office visit. Introducing [facility]! [facility] introduces [employer] patient portal. Now you can access parts of your medical record, email your doctor's office, and request medication refills online. 1. In your internet browser, go to [email or web address] 2. Click on the First Time User? Click Here link in the Sign In box. You will see the New Member Sign Up page. 3. Enter your [employer] Access Code exactly as it appears below.  You will not need to use this code after youve completed the sign-up process. If you do not sign up before the expiration date, you must request a new code. · [employer] Access Code: [email or web address] Expires: 12/27/2017  2:46 PM 
 
4. Enter the last four digits of your Social Security Number (xxxx) and Date of Birth (mm/dd/yyyy) as indicated and click Submit. You will be taken to the next sign-up page. 5. Create a [employer] ID. This will be your [employer] login ID and cannot be changed, so think of one that is secure and easy to remember. 6. Create a [employer] password. You can change your password at any time. 7. Enter your Password Reset Question and Answer. This can be used at a later time if you forget your password. 8. Enter your e-mail address. You will receive e-mail notification when new information is available in [address] 9. Click Sign Up. You can now view and download portions of your medical record. 10. Click the Download Summary menu link to download a portable copy of your medical information. If you have questions, please visit the Frequently Asked Questions section of the [employer] website. Remember, [employer] is NOT to be used for urgent needs. For medical emergencies, dial 911. Now available from your iPhone and Android! Please provide this summary of care documentation to your next provider. Your primary care clinician is listed as Bronwyn Guerrero. If you have any questions after today's visit, please call [phone].

## 2017-09-28 NOTE — PATIENT INSTRUCTIONS
[address] [facility]   Statement to Patients  April 1, 2014      In an effort to ensure the large volume of patient prescription refills is processed in the most efficient and expeditious manner, we are asking our patients to assist us by calling your Pharmacy for all prescription refills, this will include also your  Mail Order Pharmacy. The pharmacy will contact our office electronically to continue the refill process. Please do not wait until the last minute to call your pharmacy. We need at least 48 hours (2days) to fill prescriptions. We also encourage you to call your pharmacy before going to  your prescription to make sure it is ready. With regard to controlled substance prescription refill requests (narcotic refills) that need to be picked up at our office, we ask your cooperation by providing us with at least 72 hours (3days) notice that you will need a refill. We will not refill narcotic prescription refill requests after 4:00pm on any weekday, Monday through Thursday, or after 2:00pm on Fridays, or on the weekends. We encourage everyone to explore another way of getting your prescription refill request processed using [employer], our patient web portal through our electronic medical record system. [employer] is an efficient and effective way to communicate your medication request directly to the office and  downloadable as an jean claude on your smart phone . [employer] also features a review functionality that allows you to view your medication list as well as leave messages for your physician. Are you ready to get connected? If so please review the attatched instructions or speak to any of our staff to get you set up right away! Thank you so much for your cooperation. Should you have any questions please contact our Practice Administrator.     The Physicians and Staff,  [facility]           Please bring all medication bottles, including vitamins, supplements and any over-the-counter medications, to your next office visit.

## 2017-09-28 NOTE — PROGRESS NOTES
Neurology Progress Note    HISTORY PROVIDED BY: patient and friend    Chief Complaint:   Chief Complaint   Patient presents with    Seizure     f/u      Subjective:   Pt is a 59 y.o. left handed female last seen in clinic on 3/31/17 with focal onset epilepsy with h/o left MCA CVA on 7/17/13 with partial aphasia, right facial weakness and right upper extremity weakness, with persistent difficulties with language. Pt had risk factors of uncontrolled HTN, DM, hypercholesterolemia, and medical noncompliance, now compliant with meds. She had new onset seizure 2/21/14, probable focal onset seizure with secondary generalization with left hemisphere onset, and stroke is likely epileptogenic focus. Has also had a cluster of focal onset seizures without DEMAR/LOC. Seizures have been well controlled since August, 2016. Exam with elevated /100 and diaphoresis, mild expressive aphasia. Continued Carbamazepine /900mg and Fycompa 8mg every day. Continued Effexor XR 75mg every day. Continued Plavix 75mg every day. Pt called her PCP regarding BP to see if she could be seen this afternoon, given history of CVA and multiple CV risk factors. They recommended she go to the ED immediately. She had a seizure in June and Sept 3rd, this was the 5 year anniversary of her father's death 5 years ago and she was upset. Her caregiver saw the June seizure, pt was sitting in chair talking to her mother on the phone, her head went to the left, she had ictal scream.  She is in [address] [place] [address] Dr again. Pt reports improved mood. She is able to leave her home now with caregiver, she is not driving. Labs 3/31/17 - CBC and CMP were remarkable only for Glu 245. Had recent labs at [facility] office.      Past Medical History:   Diagnosis Date    Anxiety     anxiety    Diabetes ([facility]     Hypercholesterolemia     Hypertension     Localization-related symptomatic epilepsy and epileptic syndromes with complex partial seizures, intractable, without status epilepticus ([facility]     Stroke [facility])     Left MCA CVA 2013      Past Surgical History:   Procedure Laterality Date    HX  SECTION      x 3    HX GYN      2 masses removed from ovaries, noncancerous    HX HERNIA REPAIR      HX HYSTERECTOMY        Social History     Social History    Marital status:      Spouse name: N/A    Number of children: N/A    Years of education: N/A     Occupational History    Retired      Social History Main Topics    Smoking status: Never Smoker    Smokeless tobacco: Never Used    Alcohol use No    Drug use: No    Sexual activity: Not on file     Other Topics Concern    Not on file     Social History Narrative    Lives with a roommate     Family History   Problem Relation Age of Onset    Adopted: Yes          Objective:   ROS:  Per HPI-  Otherwise 10 point ROS was negative    Allergies   Allergen Reactions    Aspirin Swelling     Periorbital swelling    Codeine Angioedema    Nsaids (Non-Steroidal Anti-Inflammatory Drug) Nausea and Vomiting    Pcn [Penicillins] Swelling       Meds:  Outpatient Medications Prior to Visit   Medication Sig Dispense Refill    FYCOMPA 2 mg tablet Take 4 Tabs by mouth every evening. Max Daily Amount: 8 mg. 120 Tab 3    venlafaxine-SR (EFFEXOR-XR) 75 mg capsule TAKE 1 CAPSULE BY MOUTH DAILY. 30 Cap 1    carBAMazepine ER (CARBATROL ER) 300 mg capsule TAKE 3 CAPSULES BY MOUTH 2 TIMES A  Cap 4    hydrALAZINE (APRESOLINE) 25 mg tablet Take 1 Tab by mouth two (2) times a day. 60 Tab 0    verapamil CR (VERELAN) 360 mg CR capsule Take 360 mg by mouth daily.  LORazepam (ATIVAN) 0.5 mg tablet Take 2 Tabs by mouth every eight (8) hours as needed for Anxiety. Max Daily Amount: 3 mg. 15 Tab 0    atorvastatin (LIPITOR) 40 mg tablet Take 40 mg by mouth nightly.  metFORMIN ER (GLUCOPHAGE XR) 500 mg tablet Take 2,000 mg by mouth daily (with dinner).       valsartan-hydrochlorothiazide (DIOVAN-HCT) 320-25 mg per tablet Take 1 Tab by mouth daily. Indications: HYPERTENSION 30 Tab 0    clopidogrel (PLAVIX) 75 mg tablet Take 1 Tab by mouth daily. 30 Tab 1     No facility-administered medications prior to visit. Imaging:  MRI Results (most recent):    Results from Hospital Encounter encounter on 02/21/14   MRA BRAIN WO CONT   Narrative **Final Report**      ICD Codes / Adm. Diagnosis: 435.9  [id number] / Unspecified transient cerebral    Urinary Incontinence  Examination:  MR HEAD MRA WO CON  - [id number] - Feb 21 2014  2:34PM  Accession No:  [id number]  Reason:        REPORT:  INDICATION:  Right facial droop    EXAMINATION:  MRI BRAIN WITH/WITHOUT CONTRAST, MRA HEAD WO CONTRAST    COMPARISON:  CT head earlier today    TECHNIQUE:  MR imaging of the brain was performed with sagittal T1, axial   T1, T2, FLAIR, GRE, DWI/ADC; pre and post contrast multiplanar T1 utilizing   7 mL Gadavist.  3 D time of flight MRA of the head was performed. FINDINGS:    The ventricles are midline without hydrocephalus. There is no acute intra   or extra-axial fluid collection. There is encephalomalacia in the anterior   left middle cerebral artery territory, with associated left sided while area   and degeneration. There is a punctate remote lacunar infarction within the   left cerebellum. There is no acute infarction. The major intracranial   vascular flow-voids are patent. There is no abnormal parenchymal or   meningeal enhancement. Basilar artery is diminutive due to bilateral posterior communicating   arteries. There is mild irregularity of the bilateral posterior cerebral   arteries. Internal carotid arteries are patent. There is focal irregularity   and narrowing at the left distal M1 segment, with minimal flow in the   anterior division of the left middle cerebral artery ranges. There is also   mild irregularity throughout the bilateral anterior cerebral arteries. There   is no aneurysm.        IMPRESSION:  Encephalomalacia anterior left middle cerebral artery territory with   associated left sided Wallerian degeneration. Diminished flow anterior   division left MCA branches, felt to be chronic. No acute infarction. Signing/Reading Doctor: Brant Ayala ([id number])    Approved: Brant Ayala ([id number])  Feb 21 2014  3:35PM                                    CT Results (most recent):    Results from Hospital Encounter encounter on 01/04/15   CT CODE NEURO HEAD WO CONTRAST   Narrative **Final Report**      ICD Codes / Adm. Diagnosis: [id number] / Stroke  STROKE  Examination:  CT CODE NEURO HEAD WO CON  - [id number] - Jan 4 2015  9:08PM  Accession No:  [id number]  Reason:  aphasia, right droop      REPORT:  INDICATION: Stroke. Aphasia. FINDINGS: 5 mm axial images were obtained from the skull base through the   vertex. The ventricles and cortical sulci are appropriate in size and   configuration. There is stable encephalomalacia in the left frontal lobe. There is no evidence of intracranial hemorrhage, mass, mass effect, or acute   infarct. No extra-axial fluid collections are seen. The visualized paranasal   sinuses and mastoid air cells are clear. The orbital structures are   unremarkable. No osseous abnormalities are seen. IMPRESSION: Stable left frontal lobe encephalomalacia, compatible with old   infarct. No evidence of acute infarct, intracranial hemorrhage, or   intracranial mass.            Signing/Reading Doctor: Silvia Dobbs ([id number])    Approved: Silvia Dobbs ([id number])  Jan 4 2015  9:19PM                                      Reviewed records in [facility] and media tab today    Lab Review   Results for orders placed or performed during the hospital encounter of 36/32/55   METABOLIC PANEL, COMPREHENSIVE   Result Value Ref Range    Sodium 139 136 - 145 mmol/L    Potassium 3.8 3.5 - 5.1 mmol/L    Chloride 99 97 - 108 mmol/L    CO2 30 21 - 32 mmol/L    Anion gap 10 5 - 15 mmol/L    Glucose 245 (H) 65 - 100 mg/dL    BUN 15 6 - 20 MG/DL    Creatinine 0.66 0.55 - 1.02 MG/DL    BUN/Creatinine ratio 23 (H) 12 - 20      GFR est AA >60 >60 ml/min/1.73m2    GFR est non-AA >60 >60 ml/min/1.73m2    Calcium 9.0 8.5 - 10.1 MG/DL    Bilirubin, total 0.2 0.2 - 1.0 MG/DL    ALT (SGPT) 25 12 - 78 U/L    AST (SGOT) 8 (L) 15 - 37 U/L    Alk. phosphatase 101 45 - 117 U/L    Protein, total 7.8 6.4 - 8.2 g/dL    Albumin 3.8 3.5 - 5.0 g/dL    Globulin 4.0 2.0 - 4.0 g/dL    A-G Ratio 1.0 (L) 1.1 - 2.2     CBC WITH AUTOMATED DIFF   Result Value Ref Range    WBC 8.4 3.6 - 11.0 K/uL    RBC 4.81 3.80 - 5.20 M/uL    HGB 14.2 11.5 - 16.0 g/dL    HCT 42.3 35.0 - 47.0 %    MCV 87.9 80.0 - 99.0 FL    MCH 29.5 26.0 - 34.0 PG    MCHC 33.6 30.0 - 36.5 g/dL    RDW 12.3 11.5 - 14.5 %    PLATELET 418 527 - 106 K/uL    NEUTROPHILS 67 32 - 75 %    LYMPHOCYTES 24 12 - 49 %    MONOCYTES 7 5 - 13 %    EOSINOPHILS 2 0 - 7 %    BASOPHILS 0 0 - 1 %    ABS. NEUTROPHILS 5.6 1.8 - 8.0 K/UL    ABS. LYMPHOCYTES 2.0 0.8 - 3.5 K/UL    ABS. MONOCYTES 0.6 0.0 - 1.0 K/UL    ABS. EOSINOPHILS 0.1 0.0 - 0.4 K/UL    ABS.  BASOPHILS 0.0 0.0 - 0.1 K/UL   URINALYSIS W/MICROSCOPIC   Result Value Ref Range    Color YELLOW/STRAW      Appearance CLEAR CLEAR      Specific gravity 1.020 1.003 - 1.030      pH (UA) 5.5 5.0 - 8.0      Protein TRACE (A) NEG mg/dL    Glucose >1000 (A) NEG mg/dL    Ketone NEGATIVE  NEG mg/dL    Bilirubin NEGATIVE  NEG      Blood NEGATIVE  NEG      Urobilinogen 0.2 0.2 - 1.0 EU/dL    Nitrites NEGATIVE  NEG      Leukocyte Esterase NEGATIVE  NEG      WBC 0-4 0 - 4 /hpf    RBC 0-5 0 - 5 /hpf    Epithelial cells FEW FEW /lpf    Bacteria NEGATIVE  NEG /hpf    Hyaline cast 0-2 0 - 5 /lpf   EKG, 12 LEAD, INITIAL   Result Value Ref Range    Ventricular Rate 95 BPM    Atrial Rate 95 BPM    P-R Interval 166 ms    QRS Duration 104 ms    Q-T Interval 362 ms    QTC Calculation (Bezet) 454 ms    Calculated P Axis 51 degrees    Calculated R Axis -23 degrees    Calculated T Axis 86 degrees Diagnosis       Normal sinus rhythm  When compared with ECG of 14-JUL-2015 15:20,  Inverted T waves have replaced nonspecific T wave abnormality in Lateral   leads  Confirmed by Jazmin Bass MD, Lillian Ko ([id number]) on 3/31/2017 9:40:20 PM          Exam:  Visit Vitals    /80    Pulse 100    Resp 18    Ht 5' 5\" (1.651 m)    Wt 74.8 kg (165 lb)    SpO2 95%    BMI 27.46 kg/m2       General:  Alert, cooperative, no distress. Diaphoretic. Tremulous. Head:  Normocephalic, without obvious abnormality, atraumatic. Respiratory:  Heart:   Non labored breathing  Regular rhythm, tachycardic,  no murmurs       Extremities: Warm, no cyanosis or edema. Pulses: 2+ radial pulses. Neurologic:  MS: Alert and oriented x 4, speech intact. Language - word finding, improved since last visit. Attention and fund of knowledge appropriate. Recent and remote memory intact. Cranial Nerves:  II: visual fields Full to confrontation   II: pupils Equal, round, reactive to light   II: optic disc    III,VII: ptosis none   III,IV,VI: extraocular muscles  EOMI, no nystagmus or diplopia   V: facial light touch sensation     VII: facial muscle function   symmetric   VIII: hearing intact   IX: soft palate elevation  normal   XI: trapezius strength     XI: sternocleidomastoid strength    XII: tongue  Midline     Motor: normal bulk and tone, no tremor              Strength: 5/5 throughout, no PD    Coordination: FTN and MARIA GUADALUPE intact  Gait: normal gait  Reflexes: 2+ symmetric           Assessment/Plan    Pt is a 59 y.o. left handed female with focal onset epilepsy with h/o left MCA CVA on 7/17/13 with partial aphasia, right facial weakness and right upper extremity weakness, with persistent difficulties with language. Pt had risk factors of uncontrolled HTN, DM, hypercholesterolemia, and medical noncompliance, now compliant with meds.   New onset seizure 2/21/14, probable focal onset seizure with secondary generalization with left hemisphere onset due to stroke. Has also had a cluster of focal onset seizures without DEMAR/LOC. Now with two breakthrough seizures since last visit. Exam with elevated /80, mild expressive aphasia, but significantly improved. Continue Carbamazepine /900mg and increase Fycompa to 10mg every day. Continue Effexor XR 75mg every day. Continue Plavix 75mg every day. F/u in clinic in 4 months, instructed to call in the interim if needed. ICD-10-CM ICD-9-CM    1. Localization-related symptomatic epilepsy and epileptic syndromes with complex partial seizures, intractable, without status epilepticus ([facility] G40.219 345.41        Signed:   Pascual Bailey MD  9/28/2017

## 2017-09-28 NOTE — PROGRESS NOTES
Patient here for follow up on seizures. Patient had seizure on 9/3/17. Her father passed away the same day.

## 2017-12-04 RX ORDER — CARBAMAZEPINE 300 MG/1
CAPSULE, EXTENDED RELEASE ORAL
Qty: 180 CAP | Refills: 3 | Status: SHIPPED | OUTPATIENT
Start: 2017-12-04 | End: 2018-02-06 | Stop reason: SDUPTHER

## 2017-12-13 ENCOUNTER — TELEPHONE (OUTPATIENT)
Dept: NEUROLOGY | Age: 57
End: 2017-12-13

## 2017-12-13 DIAGNOSIS — G40.219 LOCALIZATION-RELATED SYMPTOMATIC EPILEPSY AND EPILEPTIC SYNDROMES WITH COMPLEX PARTIAL SEIZURES, INTRACTABLE, WITHOUT STATUS EPILEPTICUS (HCC): Primary | ICD-10-CM

## 2017-12-13 NOTE — TELEPHONE ENCOUNTER
Spoke with patient. She stated she had two seizures yesterday. She confirmed she is still taking Fycompa 10 mg daily Carbamazpeine 300 mg, 3 caps twice a day. She has not missed any doses. She denies any recent illnesses or being tired. She stated yesterday she had just finished getting a manicure and had a seizure while getting into a friend's car and then an hour later she had another seizure. Advised will discuss this with Dr. Riaz Hartman and will call patient back with her recommendations. She verbalized understanding.

## 2017-12-13 NOTE — TELEPHONE ENCOUNTER
----- Message from Pete Hammond sent at 12/13/2017 12:16 PM EST -----  Regarding: Dr Taniya Ellis  Pt stated she was supposed to update anytime she has a seizure, pt stated she had two seizures yesterday. Contact [phone]

## 2017-12-14 NOTE — TELEPHONE ENCOUNTER
I assume the Carbamazepine is ER 300mg caps, 3 caps bid and Fycompa 10mg daily. Has she been ill, sleep deprived, missed doses of meds? Lets check labs  If no to the above, then I will add Onfi 5mg bid.

## 2017-12-14 NOTE — TELEPHONE ENCOUNTER
Spoke with patient and informed her of Dr. Vince Arguelles recommendations. Patient requested to have lab orders mailed to her address on file. Patient was given an opportunity to ask questions, repeated information, and verbalized understanding.

## 2018-01-16 RX ORDER — VENLAFAXINE HYDROCHLORIDE 75 MG/1
CAPSULE, EXTENDED RELEASE ORAL
Qty: 90 CAP | Refills: 3 | Status: SHIPPED | OUTPATIENT
Start: 2018-01-16 | End: 2018-01-29 | Stop reason: SDUPTHER

## 2018-01-25 ENCOUNTER — TELEPHONE (OUTPATIENT)
Dept: NEUROLOGY | Age: 58
End: 2018-01-25

## 2018-01-25 ENCOUNTER — DOCUMENTATION ONLY (OUTPATIENT)
Dept: NEUROLOGY | Age: 58
End: 2018-01-25

## 2018-01-25 NOTE — PROGRESS NOTES
Received Labs from PCP dated 1/12/18:  Carbamazepine level - 14.7  CBC with diff - unremarkable. HgbA1C 6.8  CMP - Glu 105, o/w unremarkable.      TSH 2.13

## 2018-01-25 NOTE — TELEPHONE ENCOUNTER
Spoke with patient. Informed her that, upon chart review, we did not have her lab results yet that were ordered in [facility]. Patient stated she still had the lab orders and was provided with address and phone number for closest [facility] draw station to [facility]. Writer asked patient, if possible, to have labs drawn prior to her appointment on 1/29/18. Patient stated she will go tomorrow. Patient was given an opportunity to ask questions, repeated information, and verbalized understanding.

## 2018-01-27 LAB
ALBUMIN SERPL-MCNC: 4.2 G/DL (ref 3.5–5.5)
ALBUMIN/GLOB SERPL: 1.8 {RATIO} (ref 1.2–2.2)
ALP SERPL-CCNC: 84 IU/L (ref 39–117)
ALT SERPL-CCNC: 16 IU/L (ref 0–32)
AST SERPL-CCNC: 14 IU/L (ref 0–40)
BASOPHILS # BLD AUTO: 0 X10E3/UL (ref 0–0.2)
BASOPHILS NFR BLD AUTO: 0 %
BILIRUB SERPL-MCNC: <0.2 MG/DL (ref 0–1.2)
BUN SERPL-MCNC: 16 MG/DL (ref 6–24)
BUN/CREAT SERPL: 29 (ref 9–23)
CALCIUM SERPL-MCNC: 8.9 MG/DL (ref 8.7–10.2)
CARBAMAZEPINE SERPL-MCNC: 10.8 UG/ML (ref 4–12)
CHLORIDE SERPL-SCNC: 102 MMOL/L (ref 96–106)
CO2 SERPL-SCNC: 24 MMOL/L (ref 18–29)
CREAT SERPL-MCNC: 0.56 MG/DL (ref 0.57–1)
EOSINOPHIL # BLD AUTO: 0.3 X10E3/UL (ref 0–0.4)
EOSINOPHIL NFR BLD AUTO: 3 %
ERYTHROCYTE [DISTWIDTH] IN BLOOD BY AUTOMATED COUNT: 13.4 % (ref 12.3–15.4)
GFR SERPLBLD CREATININE-BSD FMLA CKD-EPI: 102 ML/MIN/1.73
GFR SERPLBLD CREATININE-BSD FMLA CKD-EPI: 118 ML/MIN/1.73
GLOBULIN SER CALC-MCNC: 2.3 G/DL (ref 1.5–4.5)
GLUCOSE SERPL-MCNC: 142 MG/DL (ref 65–99)
HCT VFR BLD AUTO: 36.4 % (ref 34–46.6)
HGB BLD-MCNC: 12 G/DL (ref 11.1–15.9)
IMM GRANULOCYTES # BLD: 0 X10E3/UL (ref 0–0.1)
IMM GRANULOCYTES NFR BLD: 0 %
LYMPHOCYTES # BLD AUTO: 2.2 X10E3/UL (ref 0.7–3.1)
LYMPHOCYTES NFR BLD AUTO: 28 %
MCH RBC QN AUTO: 28.9 PG (ref 26.6–33)
MCHC RBC AUTO-ENTMCNC: 33 G/DL (ref 31.5–35.7)
MCV RBC AUTO: 88 FL (ref 79–97)
MONOCYTES # BLD AUTO: 0.5 X10E3/UL (ref 0.1–0.9)
MONOCYTES NFR BLD AUTO: 6 %
NEUTROPHILS # BLD AUTO: 4.7 X10E3/UL (ref 1.4–7)
NEUTROPHILS NFR BLD AUTO: 63 %
PLATELET # BLD AUTO: 350 X10E3/UL (ref 150–379)
POTASSIUM SERPL-SCNC: 4.4 MMOL/L (ref 3.5–5.2)
PROT SERPL-MCNC: 6.5 G/DL (ref 6–8.5)
RBC # BLD AUTO: 4.15 X10E6/UL (ref 3.77–5.28)
SODIUM SERPL-SCNC: 142 MMOL/L (ref 134–144)
WBC # BLD AUTO: 7.6 X10E3/UL (ref 3.4–10.8)

## 2018-01-29 ENCOUNTER — OFFICE VISIT (OUTPATIENT)
Dept: NEUROLOGY | Age: 58
End: 2018-01-29

## 2018-01-29 VITALS
HEIGHT: 65 IN | WEIGHT: 175 LBS | SYSTOLIC BLOOD PRESSURE: 162 MMHG | DIASTOLIC BLOOD PRESSURE: 82 MMHG | RESPIRATION RATE: 18 BRPM | BODY MASS INDEX: 29.16 KG/M2 | HEART RATE: 96 BPM | OXYGEN SATURATION: 97 %

## 2018-01-29 DIAGNOSIS — G40.219 LOCALIZATION-RELATED SYMPTOMATIC EPILEPSY AND EPILEPTIC SYNDROMES WITH COMPLEX PARTIAL SEIZURES, INTRACTABLE, WITHOUT STATUS EPILEPTICUS (HCC): Primary | ICD-10-CM

## 2018-01-29 DIAGNOSIS — I69.320 APHASIA AS LATE EFFECT OF CEREBROVASCULAR ACCIDENT: ICD-10-CM

## 2018-01-29 RX ORDER — VENLAFAXINE HYDROCHLORIDE 75 MG/1
CAPSULE, EXTENDED RELEASE ORAL
Qty: 90 CAP | Refills: 3 | Status: SHIPPED | OUTPATIENT
Start: 2018-01-29 | End: 2019-04-26 | Stop reason: SDUPTHER

## 2018-01-29 RX ORDER — GLIMEPIRIDE 2 MG/1
TABLET ORAL
Refills: 3 | COMMUNITY
Start: 2018-01-15

## 2018-01-29 RX ORDER — CLOBAZAM 10 MG/1
TABLET ORAL
Qty: 180 TAB | Refills: 1 | Status: SHIPPED | OUTPATIENT
Start: 2018-01-29 | End: 2018-04-27

## 2018-01-29 NOTE — MR AVS SNAPSHOT
[facility] [address] [place] [address] 
[phone] Patient: Dariusz Bennett MRN: [id number] Visit Information Date & Time Provider Department Dept. Phone Encounter #  
 1/29/2018  2:00 PM Dante Foy MD [facility] at [facility] [id number] Follow-up Instructions Return in about 3 months (around 4/29/2018). Upcoming Health Maintenance Date Due Hepatitis C Screening 1960 FOOT EXAM Q1 9/12/1968 MICROALBUMIN Q1 9/12/1968 EYE EXAM RETINAL OR DILATED Q1 9/12/1968 Pneumococcal 19-64 Medium Risk (1 of 1 - PPSV23) 9/12/1977 DTaP/Tdap/Td series (1 - Tdap) 9/12/1979 PAP AKA CERVICAL CYTOLOGY 9/12/1979 BREAST CANCER SCRN MAMMOGRAM 9/12/2008 FOBT Q 1 YEAR AGE 50-75 9/12/2008 HEMOGLOBIN A1C Q6M 7/5/2015 LIPID PANEL Q1 1/5/2016 Influenza Age 5 to Adult 8/1/2017 Allergies as of 1/29/2018  Review Complete On: 1/29/2018 By: Dante Foy MD  
  
 Severity Noted Reaction Type Reactions Aspirin  02/21/2014   Systemic Swelling Periorbital swelling Codeine  07/14/2013    Angioedema Nsaids (Non-steroidal Anti-inflammatory Drug)  07/14/2013    Nausea and Vomiting Pcn [Penicillins]  07/14/2013    Swelling Current Immunizations  Reviewed on 1/6/2015 No immunizations on file. Not reviewed this visit You Were Diagnosed With   
  
 Codes Comments Localization-related symptomatic epilepsy and epileptic syndromes with complex partial seizures, intractable, without status epilepticus ([facility] [address]    -  Primary ICD-10-CM: H06.149 ICD-9-CM: 345.41 Aphasia as late effect of cerebrovascular accident     ICD-10-CM: I69.320 ICD-9-CM: 438.11 Vitals BP Pulse Resp Height(growth percentile) Weight(growth percentile) SpO2  
 162/82 96 18 5' 5\" (1.651 m) 175 lb (79.4 kg) 97% BMI OB Status Smoking Status 29.12 kg/m2 Postmenopausal Never Smoker Vitals History BMI and BSA Data Body Mass Index Body Surface Area  
 29.12 kg/m 2 1.91 m 2 Preferred Pharmacy Pharmacy Name Phone [id number] - [address] [facility] AT [address] [place] [address] [phone] Your Updated Medication List  
  
   
This list is accurate as of: 1/29/18  2:35 PM.  Always use your most recent med list.  
  
  
  
  
 carBAMazepine  mg capsule Commonly known as:  CARBATROL ER  
TAKE 3 CAPSULES BY MOUTH 2 TIMES A DAY  
  
 cloBAZam 10 mg Tab tablet Commonly known as:  ONFI Take 1/2 tab by mouth daily x 1 week, then take 1/2 tab twice a day for 1 week, then take 1/2 tab in AM and 1 tab in PM x 1 week, then 1 tab twice a day  
  
 clopidogrel 75 mg Tab Commonly known as:  PLAVIX Take 1 Tab by mouth daily. glimepiride 2 mg tablet Commonly known as:  AMARYL  
TAKE 1 TABLET BY MOUTH EVERY DAY  
  
 glucophage  mg tablet Generic drug:  metFORMIN ER Take 2,000 mg by mouth daily (with dinner). hydrALAZINE 25 mg tablet Commonly known as:  APRESOLINE Take 1 Tab by mouth two (2) times a day. LIPITOR 40 mg tablet Generic drug:  atorvastatin Take 40 mg by mouth nightly. LORazepam 0.5 mg tablet Commonly known as:  ATIVAN Take 2 Tabs by mouth every eight (8) hours as needed for Anxiety. Max Daily Amount: 3 mg.  
  
 perampanel 2 mg tablet Commonly known as:  [employer] 4 tabs by mouth daily x 1 week, then 3 tabs daily x 1 week, then 2 tabs daily x 1 week, then 1 tab daily x 1 week, then stop  
  
 valsartan-hydroCHLOROthiazide 320-25 mg per tablet Commonly known as:  DIOVAN-HCT Take 1 Tab by mouth daily. Indications: HYPERTENSION  
  
 venlafaxine-SR 75 mg capsule Commonly known as:  EFFEXOR-XR  
TAKE 1 CAPSULE BY MOUTH DAILY. verapamil  mg ER capsule Commonly known as:  Jerlene Challenger Take 360 mg by mouth daily. Prescriptions Printed Refills  
 cloBAZam (ONFI) 10 mg tab tablet 1 Sig: Take 1/2 tab by mouth daily x 1 week, then take 1/2 tab twice a day for 1 week, then take 1/2 tab in AM and 1 tab in PM x 1 week, then 1 tab twice a day Class: Print  
 perampanel (FYCOMPA) 2 mg tablet 0 Si tabs by mouth daily x 1 week, then 3 tabs daily x 1 week, then 2 tabs daily x 1 week, then 1 tab daily x 1 week, then stop Class: Print Prescriptions Sent to Pharmacy Refills  
 venlafaxine-SR (EFFEXOR-XR) 75 mg capsule 3 Sig: TAKE 1 CAPSULE BY MOUTH DAILY. Class: Normal  
 Pharmacy: [place], [address]  #: [phone] We Performed the Following REFERRAL TO SPEECH THERAPY [XCO227 Custom] Comments: [facility], [address] [place] [address] with residual aphasia after stroke, occasionally worsened by breakthrough seizures. Eval and tx Follow-up Instructions Return in about 3 months (around 2018). Referral Information Referral ID Referred By Referred To  
  
 [id number] Ashia Lozano Not Available Visits Status Start Date End Date 1 New Request 18 If your referral has a status of pending review or denied, additional information will be sent to support the outcome of this decision. Patient Instructions PRESCRIPTION REFILL POLICY [facility] Statement to Patients 2014 In an effort to ensure the large volume of patient prescription refills is processed in the most efficient and expeditious manner, we are asking our patients to assist us by calling your Pharmacy for all prescription refills, this will include also your  Mail Order Pharmacy. The pharmacy will contact our office electronically to continue the refill process. Please do not wait until the last minute to call your pharmacy.  We need at least 48 hours (2days) to fill prescriptions. We also encourage you to call your pharmacy before going to  your prescription to make sure it is ready. With regard to controlled substance prescription refill requests (narcotic refills) that need to be picked up at our office, we ask your cooperation by providing us with at least 72 hours (3days) notice that you will need a refill. We will not refill narcotic prescription refill requests after 4:00pm on any weekday, Monday through Thursday, or after 2:00pm on Fridays, or on the weekends. We encourage everyone to explore another way of getting your prescription refill request processed using Avidia, our patient web portal through our electronic medical record system. Avidia is an efficient and effective way to communicate your medication request directly to the office and  downloadable as an jean claude on your smart phone . Avidia also features a review functionality that allows you to view your medication list as well as leave messages for your physician. Are you ready to get connected? If so please review the attatched instructions or speak to any of our staff to get you set up right away! Thank you so much for your cooperation. Should you have any questions please contact our Practice Administrator. The Physicians and Staff,  [facility] Please bring all medication bottles, including vitamins, supplements and any over-the-counter medications, to your next office visit. Introducing [facility]! [facility] introduces Avidia patient portal. Now you can access parts of your medical record, email your doctor's office, and request medication refills online. 1. In your internet browser, go to [email or web address] 2. Click on the First Time User? Click Here link in the Sign In box. You will see the New Member Sign Up page. 3. Enter your Kinnek Access Code exactly as it appears below. You will not need to use this code after youve completed the sign-up process. If you do not sign up before the expiration date, you must request a new code. · Kinnek Access Code: [email or web address] Expires: 4/29/2018  1:44 PM 
 
4. Enter the last four digits of your Social Security Number (xxxx) and Date of Birth (mm/dd/yyyy) as indicated and click Submit. You will be taken to the next sign-up page. 5. Create a Kinnek ID. This will be your Kinnek login ID and cannot be changed, so think of one that is secure and easy to remember. 6. Create a Kinnek password. You can change your password at any time. 7. Enter your Password Reset Question and Answer. This can be used at a later time if you forget your password. 8. Enter your e-mail address. You will receive e-mail notification when new information is available in [address] 9. Click Sign Up. You can now view and download portions of your medical record. 10. Click the Download Summary menu link to download a portable copy of your medical information. If you have questions, please visit the Frequently Asked Questions section of the Kinnek website. Remember, Kinnek is NOT to be used for urgent needs. For medical emergencies, dial 911. Now available from your iPhone and Android! Please provide this summary of care documentation to your next provider. Your primary care clinician is listed as Norm Nab. If you have any questions after today's visit, please call [phone].

## 2018-01-29 NOTE — PROGRESS NOTES
Patient here for follow up on epilepsy. She has 3 seizures since last office visit and would like to discuss medications.

## 2018-01-29 NOTE — PATIENT INSTRUCTIONS
[facility]   Statement to Patients  April 1, 2014      In an effort to ensure the large volume of patient prescription refills is processed in the most efficient and expeditious manner, we are asking our patients to assist us by calling your Pharmacy for all prescription refills, this will include also your  Mail Order Pharmacy. The pharmacy will contact our office electronically to continue the refill process. Please do not wait until the last minute to call your pharmacy. We need at least 48 hours (2days) to fill prescriptions. We also encourage you to call your pharmacy before going to  your prescription to make sure it is ready. With regard to controlled substance prescription refill requests (narcotic refills) that need to be picked up at our office, we ask your cooperation by providing us with at least 72 hours (3days) notice that you will need a refill. We will not refill narcotic prescription refill requests after 4:00pm on any weekday, Monday through Thursday, or after 2:00pm on Fridays, or on the weekends. We encourage everyone to explore another way of getting your prescription refill request processed using YeePay, our patient web portal through our electronic medical record system. YeePay is an efficient and effective way to communicate your medication request directly to the office and  downloadable as an jean claude on your smart phone . YeePay also features a review functionality that allows you to view your medication list as well as leave messages for your physician. Are you ready to get connected? If so please review the attatched instructions or speak to any of our staff to get you set up right away! Thank you so much for your cooperation. Should you have any questions please contact our Practice Administrator.     The Physicians and Staff,  [facility]           Please bring all medication bottles, including vitamins, supplements and any over-the-counter medications, to your next office visit.

## 2018-01-29 NOTE — PROGRESS NOTES
Neurology Progress Note    HISTORY PROVIDED BY: patient    Chief Complaint:   Chief Complaint   Patient presents with    Epilepsy     f/u    Medication Evaluation      Subjective:   Pt is a 59 y.o. left handed female last seen in clinic on 17 in f/u for focal onset epilepsy with h/o left MCA CVA on 13 with partial aphasia, right facial weakness and right upper extremity weakness, with persistent difficulties with language. Pt had risk factors of uncontrolled HTN, DM, hypercholesterolemia, and medical noncompliance, now compliant with meds. New onset seizure 14, probable focal onset seizure with secondary generalization with left hemisphere onset due to stroke. Has also had a cluster of focal onset seizures without DEMAR/LOC. Had two breakthrough seizures since previous visit. Exam with elevated /80, mild expressive aphasia, but significantly improved. Continued Carbamazepine /900mg and increased Fycompa to 10mg every day. Continued Effexor XR 75mg every day. Continued Plavix 75mg every day. She returns for f/u alone. She had breakthrough seizure on Oct 23 and Dec 12 had two seizures. She would like to come off of Fycompa and start a new med, seizures have worsened since starting the Fycompa. She requests a referral to ST at [facility], has a recommendation for a ST there, Marlena Bedolla, from an acquaintance. Received Labs from PCP dated 18: Carbamazepine level - 14.7, CBC with diff - unremarkable. HgbA1C 6.8, CMP - Glu 105, o/w unremarkable. .  TSH 2.13    Past Medical History:   Diagnosis Date    Anxiety     anxiety    Diabetes ([address]     Hypercholesterolemia     Hypertension     Localization-related symptomatic epilepsy and epileptic syndromes with complex partial seizures, intractable, without status epilepticus ([address]     Stroke ([facility] [address]     Left MCA CVA 2013      Past Surgical History:   Procedure Laterality Date    HX  SECTION      x 3    HX GYN      2 masses removed from ovaries, noncancerous    HX HERNIA REPAIR      HX HYSTERECTOMY        Social History     Social History    Marital status:      Spouse name: N/A    Number of children: N/A    Years of education: N/A     Occupational History    Retired      Social History Main Topics    Smoking status: Never Smoker    Smokeless tobacco: Never Used    Alcohol use No    Drug use: No    Sexual activity: Not on file     Other Topics Concern    Not on file     Social History Narrative    Lives with a roommate     Family History   Problem Relation Age of Onset    Adopted: Yes          Objective:   ROS:  Per HPI or PMH, o/w neg    Allergies   Allergen Reactions    Aspirin Swelling     Periorbital swelling    Codeine Angioedema    Nsaids (Non-Steroidal Anti-Inflammatory Drug) Nausea and Vomiting    Pcn [Penicillins] Swelling       Meds:  Outpatient Medications Prior to Visit   Medication Sig Dispense Refill    venlafaxine-SR (EFFEXOR-XR) 75 mg capsule TAKE 1 CAPSULE BY MOUTH DAILY. 90 Cap 3    carBAMazepine ER (CARBATROL ER) 300 mg capsule TAKE 3 CAPSULES BY MOUTH 2 TIMES A DAY (Patient taking differently: TAKE 2 CAPSULES IN THE AM AND 3 CAPSULES IN THE PM) 180 Cap 3    perampanel (FYCOMPA) 10 mg tab Take 1 Tab by mouth daily. Max Daily Amount: 1 Tab. 90 Tab 3    hydrALAZINE (APRESOLINE) 25 mg tablet Take 1 Tab by mouth two (2) times a day. 60 Tab 0    verapamil CR (VERELAN) 360 mg CR capsule Take 360 mg by mouth daily.  LORazepam (ATIVAN) 0.5 mg tablet Take 2 Tabs by mouth every eight (8) hours as needed for Anxiety. Max Daily Amount: 3 mg. 15 Tab 0    atorvastatin (LIPITOR) 40 mg tablet Take 40 mg by mouth nightly.  metFORMIN ER (GLUCOPHAGE XR) 500 mg tablet Take 2,000 mg by mouth daily (with dinner).  valsartan-hydrochlorothiazide (DIOVAN-HCT) 320-25 mg per tablet Take 1 Tab by mouth daily.  Indications: HYPERTENSION 30 Tab 0    clopidogrel (PLAVIX) 75 mg tablet Take 1 Tab by mouth daily. 30 Tab 1     No facility-administered medications prior to visit. Imaging:  MRI Results (most recent):    Results from Hospital Encounter encounter on 02/21/14   MRA BRAIN WO CONT   Narrative **Final Report**      ICD Codes / Adm. Diagnosis: 435.9  246276 / Unspecified transient cerebral    Urinary Incontinence  Examination:  MR HEAD MRA WO CON  - [id number] - Feb 21 2014  2:34PM  Accession No:  [id number]  Reason:        REPORT:  INDICATION:  Right facial droop    EXAMINATION:  MRI BRAIN WITH/WITHOUT CONTRAST, MRA HEAD WO CONTRAST    COMPARISON:  CT head earlier today    TECHNIQUE:  MR imaging of the brain was performed with sagittal T1, axial   T1, T2, FLAIR, GRE, DWI/ADC; pre and post contrast multiplanar T1 utilizing   7 mL Gadavist.  3 D time of flight MRA of the head was performed. FINDINGS:    The ventricles are midline without hydrocephalus. There is no acute intra   or extra-axial fluid collection. There is encephalomalacia in the anterior   left middle cerebral artery territory, with associated left sided while area   and degeneration. There is a punctate remote lacunar infarction within the   left cerebellum. There is no acute infarction. The major intracranial   vascular flow-voids are patent. There is no abnormal parenchymal or   meningeal enhancement. Basilar artery is diminutive due to bilateral posterior communicating   arteries. There is mild irregularity of the bilateral posterior cerebral   arteries. Internal carotid arteries are patent. There is focal irregularity   and narrowing at the left distal M1 segment, with minimal flow in the   anterior division of the left middle cerebral artery ranges. There is also   mild irregularity throughout the bilateral anterior cerebral arteries. There   is no aneurysm. IMPRESSION:  Encephalomalacia anterior left middle cerebral artery territory with   associated left sided Wallerian degeneration.  Diminished flow anterior division left MCA branches, felt to be chronic. No acute infarction. Signing/Reading Doctor: Tammy Alvarez ([id number])    Approved: Tammy Alvarez ([id number])  Feb 21 2014  3:35PM                                    CT Results (most recent):    Results from Hospital Encounter encounter on 01/04/15   CT CODE NEURO HEAD WO CONTRAST   Narrative **Final Report**      ICD Codes / Adm. Diagnosis: [phone] / Stroke  STROKE  Examination:  CT CODE NEURO HEAD WO CON  - [id number] - Jan 4 2015  9:08PM  Accession No:  [id number]  Reason:  aphasia, right droop      REPORT:  INDICATION: Stroke. Aphasia. FINDINGS: 5 mm axial images were obtained from the skull base through the   vertex. The ventricles and cortical sulci are appropriate in size and   configuration. There is stable encephalomalacia in the left frontal lobe. There is no evidence of intracranial hemorrhage, mass, mass effect, or acute   infarct. No extra-axial fluid collections are seen. The visualized paranasal   sinuses and mastoid air cells are clear. The orbital structures are   unremarkable. No osseous abnormalities are seen. IMPRESSION: Stable left frontal lobe encephalomalacia, compatible with old   infarct. No evidence of acute infarct, intracranial hemorrhage, or   intracranial mass.            Signing/Reading Doctor: Elmer Rausch ([id number])    Approved: Elmer Rausch ([id number])  Jan 4 2015  9:19PM                                      Reviewed records in [facility] and media tab today    Lab Review   Results for orders placed or performed in visit on 99/62/96   METABOLIC PANEL, COMPREHENSIVE   Result Value Ref Range    Glucose 142 (H) 65 - 99 mg/dL    BUN 16 6 - 24 mg/dL    Creatinine 0.56 (L) 0.57 - 1.00 mg/dL    GFR est non- >59 mL/min/1.73    GFR est  >59 mL/min/1.73    BUN/Creatinine ratio 29 (H) 9 - 23    Sodium 142 134 - 144 mmol/L    Potassium 4.4 3.5 - 5.2 mmol/L    Chloride 102 96 - 106 mmol/L    CO2 24 18 - 29 mmol/L Calcium 8.9 8.7 - 10.2 mg/dL    Protein, total 6.5 6.0 - 8.5 g/dL    Albumin 4.2 3.5 - 5.5 g/dL    GLOBULIN, TOTAL 2.3 1.5 - 4.5 g/dL    A-G Ratio 1.8 1.2 - 2.2    Bilirubin, total <0.2 0.0 - 1.2 mg/dL    Alk. phosphatase 84 39 - 117 IU/L    AST (SGOT) 14 0 - 40 IU/L    ALT (SGPT) 16 0 - 32 IU/L   CBC WITH AUTOMATED DIFF   Result Value Ref Range    WBC 7.6 3.4 - 10.8 x10E3/uL    RBC 4.15 3.77 - 5.28 x10E6/uL    HGB 12.0 11.1 - 15.9 g/dL    HCT 36.4 34.0 - 46.6 %    MCV 88 79 - 97 fL    MCH 28.9 26.6 - 33.0 pg    MCHC 33.0 31.5 - 35.7 g/dL    RDW 13.4 12.3 - 15.4 %    PLATELET 305 130 - 569 x10E3/uL    NEUTROPHILS 63 Not Estab. %    Lymphocytes 28 Not Estab. %    MONOCYTES 6 Not Estab. %    EOSINOPHILS 3 Not Estab. %    BASOPHILS 0 Not Estab. %    ABS. NEUTROPHILS 4.7 1.4 - 7.0 x10E3/uL    Abs Lymphocytes 2.2 0.7 - 3.1 x10E3/uL    ABS. MONOCYTES 0.5 0.1 - 0.9 x10E3/uL    ABS. EOSINOPHILS 0.3 0.0 - 0.4 x10E3/uL    ABS. BASOPHILS 0.0 0.0 - 0.2 x10E3/uL    IMMATURE GRANULOCYTES 0 Not Estab. %    ABS. IMM. GRANS. 0.0 0.0 - 0.1 x10E3/uL   CARBAMAZEPINE   Result Value Ref Range    Carbamazepine 10.8 4.0 - 12.0 ug/mL        Exam:  Visit Vitals    /82    Pulse 96    Resp 18    Ht 5' 5\" (1.651 m)    Wt 79.4 kg (175 lb)    SpO2 97%    BMI 29.12 kg/m2       General:  Alert, cooperative, no distress. Head:  Normocephalic, without obvious abnormality, atraumatic. Respiratory:  Heart:   Non labored breathing  Regular rhythm, tachycardic,  no murmurs       Extremities: Warm, no cyanosis or edema. Pulses: 2+ radial pulses. Neurologic:  MS: Alert and oriented x 4, speech intact. Language - word finding, improved since last visit. Attention and fund of knowledge appropriate. Recent and remote memory intact.   Cranial Nerves:  II: visual fields    II: pupils    II: optic disc    III,VII: ptosis none   III,IV,VI: extraocular muscles  EOMI, no nystagmus or diplopia   V: facial light touch sensation     VII: facial muscle function   symmetric   VIII: hearing intact   IX: soft palate elevation     XI: trapezius strength     XI: sternocleidomastoid strength    XII: tongue       Motor: normal bulk and tone, no tremor              Strength: 5/5 throughout, no PD    Coordination: FTN and MARIA GUADALUPE intact  Gait: normal gait  Reflexes: 2+ symmetric           Assessment/Plan   Pt is a 59 y.o. left handed female with focal onset epilepsy with h/o left MCA CVA on 7/17/13 with partial aphasia, right facial weakness and right upper extremity weakness, with persistent difficulties with language. Pt had risk factors of uncontrolled HTN, DM, hypercholesterolemia, and medical noncompliance, now compliant with meds. New onset seizure 2/21/14, probable focal onset seizure with secondary generalization with left hemisphere onset due to stroke. Has also had a cluster of focal onset seizures without DEMAR/LOC. Having ongoing breakthrough seizures despite escalating doses of Fycompa added to Carbamazepine XR. Exam with elevated /82, mild expressive aphasia, but significantly improved. -Continue Carbamazepine /900mg.   -Start Onfi 5mg daily titrating to 10mg bid over 4 weeks. -Start Fycompa taper:  8mg daily x 1 week, then 6mg daily x 1 week, then 4mg daily x 1 week, then 2mg daily x 1 week, then stop.  -Continue Effexor XR 75mg daily.   -Continue Plavix 75mg daily.  -[facility]erral to [facility]. -F/u in clinic in 3 months, instructed to call in the interim if needed      ICD-10-CM ICD-9-CM    1. Localization-related symptomatic epilepsy and epileptic syndromes with complex partial seizures, intractable, without status epilepticus (HCC) G40.219 345.41 cloBAZam (ONFI) 10 mg tab tablet      perampanel (FYCOMPA) 2 mg tablet   2. Aphasia as late effect of cerebrovascular accident I69.320 438.11 REFERRAL TO SPEECH THERAPY       Signed:   Reggie Bentley MD  1/29/2018

## 2018-01-30 ENCOUNTER — DOCUMENTATION ONLY (OUTPATIENT)
Dept: NEUROLOGY | Age: 58
End: 2018-01-30

## 2018-02-02 ENCOUNTER — TELEPHONE (OUTPATIENT)
Dept: NEUROLOGY | Age: 58
End: 2018-02-02

## 2018-02-02 NOTE — TELEPHONE ENCOUNTER
Spoke with patient. Informed her that, per Dr. Verona Noel, take Onfi at night. Patient was given an opportunity to ask questions, repeated information, and verbalized understanding.

## 2018-02-06 RX ORDER — CARBAMAZEPINE 300 MG/1
CAPSULE, EXTENDED RELEASE ORAL
Qty: 450 CAP | Refills: 3 | Status: SHIPPED | OUTPATIENT
Start: 2018-02-06 | End: 2018-03-21 | Stop reason: SDUPTHER

## 2018-02-06 NOTE — TELEPHONE ENCOUNTER
Jose Manuel Orozco I sent an Rx on 12/4/17 for a 90 day supply with a year's worth of refills. Please troubleshoot.

## 2018-02-06 NOTE — TELEPHONE ENCOUNTER
Spoke with Anju Frazier at [facility]. He stated that Rx was sent in for a 30 day supply as patient takes 6 caps per day of Carbamazepine 300 mg. They are requesting 90 day supply.

## 2018-03-19 ENCOUNTER — TELEPHONE (OUTPATIENT)
Dept: NEUROLOGY | Age: 58
End: 2018-03-19

## 2018-03-19 NOTE — TELEPHONE ENCOUNTER
Spoke with patient's daughter, Elda Nuno (on HIPAA). She stated her mother was having weird behavior last Thursday, 3/15/18, \"like she had dementia or a stroke, she was just not there\". And was admitted to [facility] from 3/15-3/18. She stated the hospital felt her symptoms were due to her Onfi and cut it down to 5 mg in the morning and 5 mg in the evening. Her daughter believes they changed her Carbamezapine to 300 mg twice a day. She stated she didn't have the hospital papers in front of her. She stated they did an MRI and EEG and did not see any signs of a stroke or recent seizure but was told at discharge to follow up with Dr. Nara Salas within a week. Will request records from [place] [id number] will discuss this with Dr. Nara Salas and will call her back with her recommendations. She verbalized understanding.

## 2018-03-19 NOTE — TELEPHONE ENCOUNTER
Pt daughter calling, stated pt was having weird behavior last week, fell multiple times. Pt was taken [facility] Ron Dempsey, thinks that that Dave Branch is giving her side effects, ER cut pt Onfi dosage in half and prescribed Carbamezapine.   Pt daughter has to work at noon today, if calling after that time please contact Rayna, the patient's caretaker [phone]

## 2018-03-20 NOTE — TELEPHONE ENCOUNTER
Spoke with patient's daughter. Scheduled patient for follow upon 3/22/18 at 1 pm. She stated she may have to call back and switch it to Wednesday, 3/21/18 at 1 pm after looking at her schedule. Ms. Eyvonne Goodpasture was given an opportunity to ask questions, repeated information, and verbalized understanding.

## 2018-03-20 NOTE — TELEPHONE ENCOUNTER
Spoke with the pt's daughter at 200. Her mother had just had a seizure. I could hear pt in background assisting with medication history. Daughter was unable to tell me what dose of Carbamazepine the pt had been on and what it was changed to on discharge from [facility]. She told me that her mother was instructed to take 300mg twice a day. I was not in front of a computer at the time of the call, but the pt stated she takes 2 in PM. She was supposed to be taking 600/900mg. [facility] decreased her Onfi to 5mg bid. The daughter believes that the Gretta Lucho caused her mother's behavior changes. She looked better than ever at her last visit in January. We started Onfi then and tapered Fycompa at pt's request. She also stated that her mother took a double dose of metformin today because she had forgotten that she had already taken it. I wonder if she was taking her meds incorrectly a week ago. Jose Manuel - Please request records and please schedule her to come in on Wednesday or Thursday at [address] [place] [address] with her daughter and with all pill bottles.

## 2018-03-21 ENCOUNTER — OFFICE VISIT (OUTPATIENT)
Dept: NEUROLOGY | Age: 58
End: 2018-03-21

## 2018-03-21 VITALS
SYSTOLIC BLOOD PRESSURE: 180 MMHG | OXYGEN SATURATION: 98 % | BODY MASS INDEX: 29.82 KG/M2 | HEIGHT: 65 IN | HEART RATE: 95 BPM | RESPIRATION RATE: 14 BRPM | WEIGHT: 179 LBS | DIASTOLIC BLOOD PRESSURE: 98 MMHG

## 2018-03-21 DIAGNOSIS — I69.320 APHASIA AS LATE EFFECT OF CEREBROVASCULAR ACCIDENT: ICD-10-CM

## 2018-03-21 DIAGNOSIS — G40.219 LOCALIZATION-RELATED SYMPTOMATIC EPILEPSY AND EPILEPTIC SYNDROMES WITH COMPLEX PARTIAL SEIZURES, INTRACTABLE, WITHOUT STATUS EPILEPTICUS (HCC): Primary | ICD-10-CM

## 2018-03-21 RX ORDER — CARBAMAZEPINE 300 MG/1
600 CAPSULE, EXTENDED RELEASE ORAL 2 TIMES DAILY
Qty: 360 CAP | Refills: 3 | Status: SHIPPED | OUTPATIENT
Start: 2018-03-21 | End: 2019-04-24 | Stop reason: SDUPTHER

## 2018-03-21 RX ORDER — LAMOTRIGINE 100 MG/1
TABLET ORAL
Qty: 180 TAB | Refills: 0 | Status: SHIPPED | OUTPATIENT
Start: 2018-03-21 | End: 2018-06-06 | Stop reason: SDUPTHER

## 2018-03-21 NOTE — PROGRESS NOTES
Neurology Progress Note    HISTORY PROVIDED BY: patient, daughter, and friend    Chief Complaint:   Chief Complaint   Patient presents with    Epilepsy      Subjective:   Pt is a 59 y.o. left handed female last seen in clinic on 1/29/18 in f/u for focal onset epilepsy with h/o left MCA CVA on 7/17/13 with partial aphasia, right facial weakness and right upper extremity weakness, with persistent difficulties with language. Pt had risk factors of uncontrolled HTN, DM, hypercholesterolemia, and medical noncompliance, now compliant with meds. New onset seizure 2/21/14, probable focal onset seizure with secondary generalization with left hemisphere onset due to stroke. Has also had a cluster of focal onset seizures without DEMAR/LOC. Having ongoing breakthrough seizures despite escalating doses of Fycompa added to Carbamazepine XR. Exam with elevated /82, mild expressive aphasia, but significantly improved. -Continued Carbamazepine /900mg.   -Started Onfi 5mg daily titrating to 10mg bid over 4 weeks. -Started Fycompa taper:  8mg daily x 1 week, then 6mg daily x 1 week, then 4mg daily x 1 week, then 2mg daily x 1 week, then stop.  -Continued Effexor XR 75mg daily.   -Continued Plavix 75mg daily.  -Referred to  at [facility]  -F/u in clinic in 3 months    She returns for earlier than planned f/u. She was admitted to [facility] EM[facility] 3/16/18 - 3/18/18 after presenting with AMS and falls at home. Notes from [facility] reviewed. She reportedly stopped taking Onfi on her own 2 days prior to admission. Pt was not clear regarding Carbamazepine XR dose on admission, they discharged her on 600mg bid and Onfi 5mg bid. MRI/MRA brain without acute findings, old left frontal encephalomalacia and extensive intracranial atherosclerosis seen. EEG with mild to moderate slowing, o/w neg. Worked with PT and \"patient and daughter both feel as though she is back at her baseline. Carbamazepine level was 13.2, Na was normal. Lactate was 3.4. Incidental L1 endplate compression fx seen. Her daughter called the office on Monday, 3/19/18, reporting that her mother had just had a seizure. She was unable to tell me what doses of carbamazepine the pt was taking. I recommended giving an extra dose of carbamazepine. Her daughter reports the pt fell 3 times in the week prior to admission, was also having confusion for about a week prior. Had fallen down stairs a year ago. The pt reports that she only missed one dose of her Onfi. Her daughter also notes that she had recurrent left facial droop on admission, which happens when she has a seizures. [facility], ordered by PCP. Her daughter is concerned about her living alone and about pt getting her medications confused. She took two doses of metformin on Monday night having forgotten that she just took the med.      Past Medical History:   Diagnosis Date    Anxiety     anxiety    Diabetes ([facility]     Hypercholesterolemia     Hypertension     Localization-related symptomatic epilepsy and epileptic syndromes with complex partial seizures, intractable, without status epilepticus ([facility]     Stroke ([facility]     Left MCA CVA 2013      Past Surgical History:   Procedure Laterality Date    HX  SECTION      x 3    HX GYN      2 masses removed from ovaries, noncancerous    HX HERNIA REPAIR      HX HYSTERECTOMY        Social History     Social History    Marital status:      Spouse name: N/A    Number of children: N/A    Years of education: N/A     Occupational History    Retired      Social History Main Topics    Smoking status: Never Smoker    Smokeless tobacco: Never Used    Alcohol use No    Drug use: No    Sexual activity: Not on file     Other Topics Concern    Not on file     Social History Narrative    Lives with a roommate     Family History   Problem Relation Age of Onset    Adopted: Yes          Objective:   ROS:  Per HPI or PMH, o/w neg    Allergies   Allergen Reactions    Aspirin Swelling     Periorbital swelling    Codeine Angioedema    Nsaids (Non-Steroidal Anti-Inflammatory Drug) Nausea and Vomiting    Pcn [Penicillins] Swelling       Meds:  Outpatient Medications Prior to Visit   Medication Sig Dispense Refill    carBAMazepine ER (CARBATROL ER) 300 mg capsule TAKE 2 CAPSULES IN THE AM AND 3 CAPSULES IN THE PM (Patient taking differently: TAKE 2 CAPSULES IN THE AM AND 2 CAPSULES IN THE PM) 450 Cap 3    glimepiride (AMARYL) 2 mg tablet TAKE 1 TABLET BY MOUTH EVERY DAY  3    cloBAZam (ONFI) 10 mg tab tablet Take 1/2 tab by mouth daily x 1 week, then take 1/2 tab twice a day for 1 week, then take 1/2 tab in AM and 1 tab in PM x 1 week, then 1 tab twice a day 180 Tab 1    venlafaxine-SR (EFFEXOR-XR) 75 mg capsule TAKE 1 CAPSULE BY MOUTH DAILY. 90 Cap 3    hydrALAZINE (APRESOLINE) 25 mg tablet Take 1 Tab by mouth two (2) times a day. 60 Tab 0    verapamil CR (VERELAN) 360 mg CR capsule Take 360 mg by mouth daily.  LORazepam (ATIVAN) 0.5 mg tablet Take 2 Tabs by mouth every eight (8) hours as needed for Anxiety. Max Daily Amount: 3 mg. 15 Tab 0    atorvastatin (LIPITOR) 40 mg tablet Take 40 mg by mouth nightly.  metFORMIN ER (GLUCOPHAGE XR) 500 mg tablet Take 2,000 mg by mouth daily (with dinner).  valsartan-hydrochlorothiazide (DIOVAN-HCT) 320-25 mg per tablet Take 1 Tab by mouth daily. Indications: HYPERTENSION 30 Tab 0    clopidogrel (PLAVIX) 75 mg tablet Take 1 Tab by mouth daily. 30 Tab 1    perampanel (FYCOMPA) 2 mg tablet 4 tabs by mouth daily x 1 week, then 3 tabs daily x 1 week, then 2 tabs daily x 1 week, then 1 tab daily x 1 week, then stop 70 Tab 0     No facility-administered medications prior to visit. Imaging:  MRI Results (most recent):    Results from Hospital Encounter encounter on 02/21/14   MRA BRAIN WO CONT   Narrative **Final Report**      ICD Codes / Adm. Diagnosis: 435.9  [id number] / Unspecified transient cerebral    Urinary Incontinence  Examination:  MR HEAD MRA WO CON  - [id number] - Feb 21 2014  2:34PM  Accession No:  [id number]  Reason:        REPORT:  INDICATION:  Right facial droop    EXAMINATION:  MRI BRAIN WITH/WITHOUT CONTRAST, MRA HEAD WO CONTRAST    COMPARISON:  CT head earlier today    TECHNIQUE:  MR imaging of the brain was performed with sagittal T1, axial   T1, T2, FLAIR, GRE, DWI/ADC; pre and post contrast multiplanar T1 utilizing   7 mL Gadavist.  3 D time of flight MRA of the head was performed. FINDINGS:    The ventricles are midline without hydrocephalus. There is no acute intra   or extra-axial fluid collection. There is encephalomalacia in the anterior   left middle cerebral artery territory, with associated left sided while area   and degeneration. There is a punctate remote lacunar infarction within the   left cerebellum. There is no acute infarction. The major intracranial   vascular flow-voids are patent. There is no abnormal parenchymal or   meningeal enhancement. Basilar artery is diminutive due to bilateral posterior communicating   arteries. There is mild irregularity of the bilateral posterior cerebral   arteries. Internal carotid arteries are patent. There is focal irregularity   and narrowing at the left distal M1 segment, with minimal flow in the   anterior division of the left middle cerebral artery ranges. There is also   mild irregularity throughout the bilateral anterior cerebral arteries. There   is no aneurysm. IMPRESSION:  Encephalomalacia anterior left middle cerebral artery territory with   associated left sided Wallerian degeneration. Diminished flow anterior   division left MCA branches, felt to be chronic. No acute infarction.              Signing/Reading Doctor: Luis Enrique Calero ([id number])    Approved: Luis Enrique Calero ([id number])  Feb 21 2014  3:35PM                                    CT Results (most recent):    [place] [facility] [place]   CT CODE NEURO HEAD WO CONTRAST   Narrative **Final Report**      ICD Codes / Adm. Diagnosis: [phone] / Stroke  STROKE  Examination:  CT CODE NEURO HEAD WO CON  - [id number] - Jan 4 2015  9:08PM  Accession No:  [id number]  Reason:  aphasia, right droop      REPORT:  INDICATION: Stroke. Aphasia. FINDINGS: 5 mm axial images were obtained from the skull base through the   vertex. The ventricles and cortical sulci are appropriate in size and   configuration. There is stable encephalomalacia in the left frontal lobe. There is no evidence of intracranial hemorrhage, mass, mass effect, or acute   infarct. No extra-axial fluid collections are seen. The visualized paranasal   sinuses and mastoid air cells are clear. The orbital structures are   unremarkable. No osseous abnormalities are seen. IMPRESSION: Stable left frontal lobe encephalomalacia, compatible with old   infarct. No evidence of acute infarct, intracranial hemorrhage, or   intracranial mass. Signing/Reading Doctor: Mila Rondon ([id number])    Approved: Mila Rondon ([id number])  Jan 4 2015  9:19PM                                      Reviewed records in [facility] and media tab today    Lab Review   Results for orders placed or performed in visit on 45/50/71   METABOLIC PANEL, COMPREHENSIVE   Result Value Ref Range    Glucose 142 (H) 65 - 99 mg/dL    BUN 16 6 - 24 mg/dL    Creatinine 0.56 (L) 0.57 - 1.00 mg/dL    GFR est non- >59 mL/min/1.73    GFR est  >59 mL/min/1.73    BUN/Creatinine ratio 29 (H) 9 - 23    Sodium 142 134 - 144 mmol/L    Potassium 4.4 3.5 - 5.2 mmol/L    Chloride 102 96 - 106 mmol/L    CO2 24 18 - 29 mmol/L    Calcium 8.9 8.7 - 10.2 mg/dL    Protein, total 6.5 6.0 - 8.5 g/dL    Albumin 4.2 3.5 - 5.5 g/dL    GLOBULIN, TOTAL 2.3 1.5 - 4.5 g/dL    A-G Ratio 1.8 1.2 - 2.2    Bilirubin, total <0.2 0.0 - 1.2 mg/dL    Alk.  phosphatase 84 39 - 117 IU/L    AST (SGOT) 14 0 - 40 IU/L    ALT (SGPT) 16 0 - 32 IU/L   CBC WITH AUTOMATED DIFF   Result Value Ref Range    WBC 7.6 3.4 - 10.8 x10E3/uL    RBC 4.15 3.77 - 5.28 x10E6/uL    HGB 12.0 11.1 - 15.9 g/dL    HCT 36.4 34.0 - 46.6 %    MCV 88 79 - 97 fL    MCH 28.9 26.6 - 33.0 pg    MCHC 33.0 31.5 - 35.7 g/dL    RDW 13.4 12.3 - 15.4 %    PLATELET 149 601 - 055 x10E3/uL    NEUTROPHILS 63 Not Estab. %    Lymphocytes 28 Not Estab. %    MONOCYTES 6 Not Estab. %    EOSINOPHILS 3 Not Estab. %    BASOPHILS 0 Not Estab. %    ABS. NEUTROPHILS 4.7 1.4 - 7.0 x10E3/uL    Abs Lymphocytes 2.2 0.7 - 3.1 x10E3/uL    ABS. MONOCYTES 0.5 0.1 - 0.9 x10E3/uL    ABS. EOSINOPHILS 0.3 0.0 - 0.4 x10E3/uL    ABS. BASOPHILS 0.0 0.0 - 0.2 x10E3/uL    IMMATURE GRANULOCYTES 0 Not Estab. %    ABS. IMM. GRANS. 0.0 0.0 - 0.1 x10E3/uL   CARBAMAZEPINE   Result Value Ref Range    Carbamazepine 10.8 4.0 - 12.0 ug/mL        Exam:  Visit Vitals    BP (!) 180/98    Pulse 95    Resp 14    Ht 5' 5\" (1.651 m)    Wt 81.2 kg (179 lb)    SpO2 98%    BMI 29.79 kg/m2       General:  Alert, cooperative, no distress. Head:  Normocephalic, without obvious abnormality, atraumatic. Respiratory:  Heart:   Non labored breathing  Regular rhythm, tachycardic,  no murmurs       Extremities: Warm, no cyanosis or edema. Pulses: 2+ radial pulses. Neurologic:  MS: Alert and oriented x 4, speech intact. Language - word finding. Attention and fund of knowledge appropriate. Recent and remote memory intact.   Cranial Nerves:  II: visual fields VFF   II: pupils    II: optic disc    III,VII: ptosis none   III,IV,VI: extraocular muscles  EOMI, no nystagmus or diplopia   V: facial light touch sensation     VII: facial muscle function   symmetric   VIII: hearing intact   IX: soft palate elevation     XI: trapezius strength     XI: sternocleidomastoid strength    XII: tongue       Motor: normal bulk and tone, no tremor              Strength: 5/5 throughout, no PD    Coordination: FTN and MARIA GUADALUPE intact  Gait: normal gait  Reflexes: 2+ symmetric           Assessment/Plan   Pt is a 59 y.o. left handed female with focal onset epilepsy with h/o left MCA CVA on 7/17/13 with partial aphasia, right facial weakness and right upper extremity weakness, with persistent difficulties with language. Pt had risk factors of uncontrolled HTN, DM, hypercholesterolemia, and medical noncompliance, now compliant with meds. New onset seizure 2/21/14, probable focal onset seizure with secondary generalization with left hemisphere onset due to stroke. Has also had a cluster of focal onset seizures without DEMAR/LOC. Having ongoing breakthrough seizures. Has failed Vimpat, Keppra - mood changes, Fycompa, and now Onfi - ineffective and possible imbalance. Exam with elevated /98, mild expressive aphasia, o/w non-focal.  She was likely having breakthrough seizures in the week prior to her admission causing falls and confusion, with daughter reporting worsening of her facial droop and elevated LA on admission. Discussed the refractory nature of her epilepsy and that she is unlikely to ever be seizure free with medications at this point.    -Discussed surgical options. They are not interested in a resective surgery at this point, but are considering VNS. Pt given written information regarding VNS placement.   -Continue Carbamazepine XR 600mg bid.   -Start Lamictal titration, 50mg daily increasing to 100mg bid over 6 weeks, titration printed for pt, side effects reviewed. -Carbamazepine and Lamictal levels at 7 weeks. -At week 2, decrease Onfi from 5mg bid to 5mg daily x 1 week, then stop  -Continue Effexor XR 75mg daily.   -Continue Plavix 75mg daily.  -Discussed my concerns about her continuing in current living situations. She needs someone monitoring and assisting with medications. Discussed potential options to consider. Agree with planned HH. -Keep f/u appt in April, instructed to call with any questions or concerns.        ICD-10-CM ICD-9-CM    1. Localization-related symptomatic epilepsy and epileptic syndromes with complex partial seizures, intractable, without status epilepticus ([facility] G40.219 345.41    2. Aphasia as late effect of cerebrovascular accident I69.320 438.11      Contributing components for this patient's visit included discussion of refractory localization related epilepsy, prognosis, treatment options including surgical options, medications risks and benefits. This comprised greater than 50% face-to-face time with the patient, the total of which was approximately 45 minutes. Signed:   Damon Lynn MD  3/21/2018

## 2018-03-21 NOTE — PROGRESS NOTES
Patient is here for follow up for epilepsy  Patient states she has not been doing well  Went on Onfi, fell 3 times, daughter states she can't seperate the falls  Fracture on L2  Unsteady while standing  Daughters feel she is declining

## 2018-03-21 NOTE — MR AVS SNAPSHOT
[facility] [address] [place] [address] 
[phone] Patient: Samantha Cotter MRN: [id number] FAK:6/14/0932 Visit Information Date & Time Provider Department Dept. Phone Encounter #  
 3/21/2018  1:00 PM Carlos Sanchez MD [address] [place] [address] Neurology Clinic at [place] [id number] Your Appointments 4/26/2018 11:40 AM  
Follow Up with Carlos Sacnhez MD  
[address] [place] [address] Neurology Clinic at [address]) Appt Note: 3 month f/u epilepsy NN 1/29/18  
 [address] [place] [address] [address] [facility] [address]  
[phone]  
  
   
 [address] [facility] [address] [facility]  [address] Upcoming Health Maintenance Date Due Hepatitis C Screening 1960 FOOT EXAM Q1 9/12/1968 EYE EXAM RETINAL OR DILATED Q1 9/12/1968 Pneumococcal 19-64 Medium Risk (1 of 1 - PPSV23) 9/12/1977 DTaP/Tdap/Td series (1 - Tdap) 9/12/1979 PAP AKA CERVICAL CYTOLOGY 9/12/1979 BREAST CANCER SCRN MAMMOGRAM 9/12/2008 FOBT Q 1 YEAR AGE 50-75 9/12/2008 Influenza Age 5 to Adult 8/1/2017 HEMOGLOBIN A1C Q6M 7/13/2018 MICROALBUMIN Q1 1/13/2019 LIPID PANEL Q1 1/13/2019 Allergies as of 3/21/2018  Review Complete On: 3/21/2018 By: Carlos Sanchez MD  
  
 Severity Noted Reaction Type Reactions Aspirin  02/21/2014   Systemic Swelling Periorbital swelling Codeine  07/14/2013    Angioedema Nsaids (Non-steroidal Anti-inflammatory Drug)  07/14/2013    Nausea and Vomiting Pcn [Penicillins]  07/14/2013    Swelling Current Immunizations  Reviewed on 1/6/2015 No immunizations on file. Not reviewed this visit You Were Diagnosed With   
  
 Codes Comments Localization-related symptomatic epilepsy and epileptic syndromes with complex partial seizures, intractable, without status epilepticus ([facility]    -  Primary ICD-10-CM: N37.469 ICD-9-CM: 345.41   
 Aphasia as late effect of cerebrovascular accident     ICD-10-CM: I69.320 ICD-9-CM: 438.11 Vitals BP Pulse Resp Height(growth percentile) Weight(growth percentile) SpO2  
 (!) 180/98 95 14 5' 5\" (1.651 m) 179 lb (81.2 kg) 98% BMI OB Status Smoking Status 29.79 kg/m2 Postmenopausal Never Smoker Vitals History BMI and BSA Data Body Mass Index Body Surface Area  
 29.79 kg/m 2 1.93 m 2 Preferred Pharmacy Pharmacy Name Phone [id number] [place] - [address] [facility] AT [address] [place] [address] [phone] Your Updated Medication List  
  
   
This list is accurate as of 3/21/18  1:54 PM.  Always use your most recent med list.  
  
  
  
  
 carBAMazepine  mg capsule Commonly known as:  CARBATROL ER Take 2 Caps by mouth two (2) times a day. cloBAZam 10 mg Tab tablet Commonly known as:  ONFI Take 1/2 tab by mouth daily x 1 week, then take 1/2 tab twice a day for 1 week, then take 1/2 tab in AM and 1 tab in PM x 1 week, then 1 tab twice a day  
  
 clopidogrel 75 mg Tab Commonly known as:  PLAVIX Take 1 Tab by mouth daily. glimepiride 2 mg tablet Commonly known as:  AMARYL  
TAKE 1 TABLET BY MOUTH EVERY DAY  
  
 glucophage  mg tablet Generic drug:  metFORMIN ER Take 2,000 mg by mouth daily (with dinner). hydrALAZINE 25 mg tablet Commonly known as:  APRESOLINE Take 1 Tab by mouth two (2) times a day. lamoTRIgine 100 mg tablet Commonly known as: LaMICtal  
Follow titration schedule to initial goal dose of 100mg bid LIPITOR 40 mg tablet Generic drug:  atorvastatin Take 40 mg by mouth nightly. LORazepam 0.5 mg tablet Commonly known as:  ATIVAN Take 2 Tabs by mouth every eight (8) hours as needed for Anxiety. Max Daily Amount: 3 mg.  
  
 valsartan-hydroCHLOROthiazide 320-25 mg per tablet Commonly known as:  DIOVAN-HCT  
 Take 1 Tab by mouth daily. Indications: HYPERTENSION  
  
 venlafaxine-SR 75 mg capsule Commonly known as:  EFFEXOR-XR  
TAKE 1 CAPSULE BY MOUTH DAILY. verapamil  mg ER capsule Commonly known as:  Medeiros Snide Take 360 mg by mouth daily. Prescriptions Sent to Pharmacy Refills  
 carBAMazepine ER (CARBATROL ER) 300 mg capsule 3 Sig: Take 2 Caps by mouth two (2) times a day. Class: Normal  
 [facility] #: [phone] Route: Oral  
 lamoTRIgine (LAMICTAL) 100 mg tablet 0 Sig: Follow titration schedule to initial goal dose of 100mg bid Class: Normal  
 Pharmacy: [place], [address] [facility] #: [phone] Patient Instructions Wean Onfi 5mg tabs: 
  AM  PM 
Week 2 None  5mg Week 3 None  None Start Lamictal 50 mg tabs:  
   AM  PM 
Weeks 1 and 2:    50mg Weeks 3 and 4: 50mg  50mg Week 5 and 6:  100mg  100mg Week 7 Stay at this dose and have Lamictal and Carbamazepine levels drawn. (I will order these labs at next visit.) Introducing \[facility]\"" & HEALTH SERVICES! Harleen Hagen introduces [employer] patient portal. Now you can access parts of your medical record, email your doctor's office, and request medication refills online. 1. In your internet browser, go to [email or web address] 2. Click on the First Time User? Click Here link in the Sign In box. You will see the New Member Sign Up page. 3. Enter your [employer] Access Code exactly as it appears below. You will not need to use this code after youve completed the sign-up process. If you do not sign up before the expiration date, you must request a new code. · [employer] Access Code: [email or web address] Expires: 4/29/2018  2:44 PM 
 
4.  Enter the last four digits of your Social Security Number (xxxx) and Date of Birth (mm/dd/yyyy) as indicated and click Submit. You will be taken to the next sign-up page. 5. Create a [employer] ID. This will be your [employer] login ID and cannot be changed, so think of one that is secure and easy to remember. 6. Create a [employer] password. You can change your password at any time. 7. Enter your Password Reset Question and Answer. This can be used at a later time if you forget your password. 8. Enter your e-mail address. You will receive e-mail notification when new information is available in [address] 9. Click Sign Up. You can now view and download portions of your medical record. 10. Click the Download Summary menu link to download a portable copy of your medical information. If you have questions, please visit the Frequently Asked Questions section of the [employer] website. Remember, [employer] is NOT to be used for urgent needs. For medical emergencies, dial 911. Now available from your iPhone and Android! Please provide this summary of care documentation to your next provider. Your primary care clinician is listed as Manuel Bernardo. If you have any questions after today's visit, please call [phone].

## 2018-03-21 NOTE — PATIENT INSTRUCTIONS
Wean Onfi 5mg tabs:    AM  PM  Week 2 None  5mg    Week 3 None  None      Start Lamictal 50 mg tabs:      AM  PM  Weeks 1 and 2:    50mg  Weeks 3 and 4: 50mg  50mg  Week 5 and 6:  100mg  100mg  Week 7 Stay at this dose and have Lamictal and Carbamazepine levels drawn.   (I will order these labs at next visit.)

## 2018-03-26 ENCOUNTER — TELEPHONE (OUTPATIENT)
Dept: NEUROLOGY | Age: 58
End: 2018-03-26

## 2018-03-26 DIAGNOSIS — I63.9 CEREBRAL INFARCTION, UNSPECIFIED MECHANISM (HCC): ICD-10-CM

## 2018-03-26 DIAGNOSIS — F41.9 ANXIETY: ICD-10-CM

## 2018-03-26 DIAGNOSIS — I69.319 CVA, OLD, COGNITIVE DEFICITS: ICD-10-CM

## 2018-03-26 DIAGNOSIS — F32.A DEPRESSION, UNSPECIFIED DEPRESSION TYPE: ICD-10-CM

## 2018-03-26 DIAGNOSIS — G40.219 LOCALIZATION-RELATED SYMPTOMATIC EPILEPSY AND EPILEPTIC SYNDROMES WITH COMPLEX PARTIAL SEIZURES, INTRACTABLE, WITHOUT STATUS EPILEPTICUS (HCC): Primary | ICD-10-CM

## 2018-03-26 RX ORDER — LORAZEPAM 0.5 MG/1
1 TABLET ORAL
Qty: 15 TAB | Refills: 0 | Status: CANCELLED | OUTPATIENT
Start: 2018-03-26

## 2018-03-26 NOTE — TELEPHONE ENCOUNTER
Spoke with patient's daughter, Vargas Etienne (on HIPAA). She stated that her mother usually has a prescription for Ativan that she takes after having a seizure. She stated when she went to the PCP recently, they did not refill it. She was wondering if Dr. Yolanda Hutchinson could refill it. She takes Ativan 0.5 mg. She also reported that her mother has had \"very serious mood swings\" since being home from the hospital and \"seems perfectly fine one minute and then other times will say really random things that don't make sense\". She stated her mother has \"temper tantrums\". She also stated that her mother tried to drive her car \"because she felt that she could because it was in her driveway\". She stated that her mother Marielos Butler" like she is \"fine\" when going to the doctor but Celestino Aleman stated that was not how things are at home. She felt she was [place] an argument with a child\" over her not being able to drive. She has recorded her mother during these times when she is having \"tantrums\". She stated her mom's friend that handles her fiances, she is worried about frontal temporal dementia. Celestino Aleman also reported she feels like her mother is acting like her grandmother who has dementia. She also reported her mother seems depressed at times as well. She stated her mother has a follow up on 4/26/18 and wants to discuss these issues at this follow up but is concerned and is not sure if there is something she should be doing in the meantime. Advised will discuss this with Dr. Yolanda Hutchinson and will call back with her recommendations. She verbalized understanding.

## 2018-03-26 NOTE — TELEPHONE ENCOUNTER
Pt daughter calling, stated that pt did not get a refill when she last went to PCP office, was wondering if Dr. Juan Garnica would be able to prescribe Ativan for patient to take after she has a seizure. When in last appt, stated that Dr. Juan Garnica recommended assisted living or a nurse in the home. Pt daughter stated that she has been the primary care for the patient and was concerned for when for the first time this week patient has referenced that her life is not worth living. Stated that her mood has been up and down a lot. [facility] requesting a returned phone call to discuss.

## 2018-03-26 NOTE — TELEPHONE ENCOUNTER
Jose Manuel - Please call pt's daughter. Thanks for the information. She may need to disable her mother car so that it cannot be driven. I have had her on Effexor XR for mood stabilization and have increased the dose in the past.  I recommend she see psychiatry as soon as possible to help us with this mood and behavior issue. I was unaware that her PCP was prescribing a benzo for her. According to her , Dr. Daniela Izquierdo at Dr. Gabrielle Watts office prescribed Ativan for her on 3/20/18 (and interestingly it did not go to her regular pharmacy and someone paid cash for it.)   I will not be prescribing this and I do not feel that she should be taking this regularly as it could complicate management of her epilepsy and it is not meant to be a long term treatment for anxiety. I have placed referral to psych. Please send this note to Dr. Daniela Izquierdo and Dr. Miley Resendiz.

## 2018-03-27 PROBLEM — I69.319 CVA, OLD, COGNITIVE DEFICITS: Status: ACTIVE | Noted: 2018-03-27

## 2018-03-27 NOTE — TELEPHONE ENCOUNTER
Dr. Svitlana Delgado at [facility] is not accepting new patients at this time. [facility] office is booking out until August, per PSR at [address] [facility]. Verbally discussed with Dr. Kiran Malcolm, will send referral to Dr. Marilu Kim office instead. Left message for patient's daughter to call the office back.

## 2018-03-27 NOTE — TELEPHONE ENCOUNTER
Spoke with patient's daughter, Bonnita Dakins. Informed her of Dr. Quang Palencia recommendations. She stated Dr. Alvarez Glaser stated he was going to send compression socks to [facility] but sent Ativan there as well. She did state that [facility] is not her mother's normal pharmacy. Bonnita Dakins stated she feels her mother needs a nurse to come in when she is not there to help care for her. She would like recommendations for in home aide/caretaker. She stated that they have cameras set up in their house and she has seen video of her mother taking her medications and then will come up to the camera and ask if she is taking her medication correctly and if she is not sure, she will pour out her pills onto the table to figure it out. She also reported her mother had a seizure this morning. She stated that her mother had [de-identified] friend take her to [facility] to  Ativan that had been sent there by Dr. Gary Santiago and Bonnita Dakins reported that her mother took two pills when she got home instead of one. Advised will send referral to Dr. Kalina Burgos office and provided contact number to follow up on scheduling an appointment. Advised will discuss this with Dr. Cedric Sher and will call back with her recommendations. She verbalized understanding.

## 2018-03-27 NOTE — TELEPHONE ENCOUNTER
----- Message from Jose Bell sent at 3/27/2018 10:37 AM EDT -----  Regarding: Dr. Ivon Benites missed a call from the practice and would like for another to be made. Best contact number is [phone].

## 2018-03-27 NOTE — TELEPHONE ENCOUNTER
Jose Manuel - I will place a referral for home health, I have no way to know what her insurance will cover or not cover. I suspect that her insurance will not cover someone to come daily, twice a day to administer meds. They will likely have to pay someone to stay with her. I do not feel she is safe to stay alone at this point.

## 2018-03-28 ENCOUNTER — TELEPHONE (OUTPATIENT)
Dept: NEUROLOGY | Age: 58
End: 2018-03-28

## 2018-03-28 NOTE — TELEPHONE ENCOUNTER
Spoke with patient's daughter. She stated that a home health nurse and physical therapist came in yesterday and has an occupational therapist is there today. She stated she was not aware that a home health referral was placed for her mother. She stated that her mother did not tell her her PCP had requested home health for her. Writer stated will hold on sending the referral Dr. Brooke Caballero placed since it has already been initiated. Asked for Essence Caban to call back with Home Health agency name and number. Essence Caban was given an opportunity to ask questions, repeated information, and verbalized understanding.

## 2018-04-09 ENCOUNTER — TELEPHONE (OUTPATIENT)
Dept: NEUROLOGY | Age: 58
End: 2018-04-09

## 2018-04-09 NOTE — TELEPHONE ENCOUNTER
Spoke with patient. Informed her that the most recent prescription for Carbamazepine 300 mg is to take 2 caps by mouth twice a day. Patient was given an opportunity to ask questions, repeated information, and verbalized understanding.

## 2018-04-09 NOTE — TELEPHONE ENCOUNTER
----- Message from Cleo Bergeron sent at 4/9/2018  8:06 AM EDT -----  Regarding: Dr. Fuentes/Rx/Telephone  Pt needs a call regarding her Rx Carbamazepine, she needs to know how to take the medication at night is it, 2 and 2 or 2 and 3. Pt best contact number is [phone].

## 2018-04-10 ENCOUNTER — TELEPHONE (OUTPATIENT)
Dept: NEUROLOGY | Age: 58
End: 2018-04-10

## 2018-04-10 NOTE — TELEPHONE ENCOUNTER
Pt has an appt on 4/26 but does not have a ride. She would like to know if she can be squeezed in on either the 23 or 25.  Please call back

## 2018-04-10 NOTE — TELEPHONE ENCOUNTER
Nancy Banuelos - Please call pt:  She may come in on Wed at 10AM (new pt spot, just leave it as a 40 minute spot, but changed to f/u status.)

## 2018-04-11 NOTE — TELEPHONE ENCOUNTER
From FRANKI KELLY: A new patient was placed in the 10AM spot, any where else we can put her? (Routing comment)     LETICIA - There is no one in the 10AM new pt spot on Wednesday, April 25th, one of the days she requested.

## 2018-04-20 ENCOUNTER — TELEPHONE (OUTPATIENT)
Dept: NEUROLOGY | Age: 58
End: 2018-04-20

## 2018-04-20 NOTE — TELEPHONE ENCOUNTER
Spoke with patient, she states  gastroenterologist wants her to come off of Plavix for 7 days for a colonoscopy, a fax was being sent over from [facility] office in regards to this. She feels uncomfortable coming off of medication. And wanted to make sure  was aware.

## 2018-04-20 NOTE — TELEPHONE ENCOUNTER
----- Message from Antonio Jones sent at 4/20/2018 12:15 PM EDT -----  Regarding: Dr. Sara Wise telephone  The pt is requesting a callback from the nurse today. Best contact number is [phone].

## 2018-04-25 ENCOUNTER — OFFICE VISIT (OUTPATIENT)
Dept: NEUROLOGY | Age: 58
End: 2018-04-25

## 2018-04-25 VITALS
WEIGHT: 178 LBS | BODY MASS INDEX: 29.62 KG/M2 | HEART RATE: 105 BPM | DIASTOLIC BLOOD PRESSURE: 84 MMHG | SYSTOLIC BLOOD PRESSURE: 152 MMHG | OXYGEN SATURATION: 98 % | RESPIRATION RATE: 18 BRPM

## 2018-04-25 DIAGNOSIS — G40.219 LOCALIZATION-RELATED SYMPTOMATIC EPILEPSY AND EPILEPTIC SYNDROMES WITH COMPLEX PARTIAL SEIZURES, INTRACTABLE, WITHOUT STATUS EPILEPTICUS (HCC): Primary | ICD-10-CM

## 2018-04-25 DIAGNOSIS — I63.9 CEREBRAL INFARCTION, UNSPECIFIED MECHANISM (HCC): ICD-10-CM

## 2018-04-25 DIAGNOSIS — I69.320 APHASIA AS LATE EFFECT OF CEREBROVASCULAR ACCIDENT: ICD-10-CM

## 2018-04-25 DIAGNOSIS — F32.A DEPRESSION, UNSPECIFIED DEPRESSION TYPE: ICD-10-CM

## 2018-04-25 NOTE — PATIENT INSTRUCTIONS

## 2018-04-25 NOTE — PROGRESS NOTES
Neurology Progress Note    HISTORY PROVIDED BY: patient, daughter    Chief Complaint:   Chief Complaint   Patient presents with    Seizure      Subjective:   Pt is a 59 y.o. left handed female last seen in clinic 3/21/18 in f/u for focal onset epilepsy with h/o left MCA CVA on 7/17/13 with partial aphasia, right facial weakness and right upper extremity weakness, with persistent difficulties with language. Pt had risk factors of uncontrolled HTN, DM, hypercholesterolemia, and medical noncompliance at the time of stroke. New onset seizure 2/21/14, probable focal onset seizure with secondary generalization with left hemisphere onset due to stroke. Has also had a cluster of focal onset seizures without DEMAR/LOC. Having ongoing breakthrough seizures, failed Vimpat, Keppra - mood changes, Fycompa, and Onfi - ineffective and possible imbalance. Exam with elevated /98, mild expressive aphasia, o/w non-focal.  Recent hospitalization, likely having breakthrough seizures in the week prior to her admission causing falls and confusion, with daughter reporting worsening of her facial droop and elevated LA on admission. Discussed the refractory nature of her epilepsy and that she is unlikely to ever be seizure free with medications at this point.    -Discussed surgical options. They are not interested in a resective surgery at this point, but are considering VNS. Pt given written information regarding VNS placement.   -Continued Carbamazepine XR 600mg bid.   -Started Lamictal titration, 50mg daily increasing to 100mg bid over 6 weeks, titration printed for pt  -Carbamazepine and Lamictal levels at 7 weeks. -At week 2, decrease Onfi from 5mg bid to 5mg daily x 1 week, then stop  -Continued Effexor XR 75mg daily.   -Continued Plavix 75mg daily.  -Discussed my concerns about her continuing in current living situations. She needs someone monitoring and assisting with medications. Discussed potential options to consider. Agree with planned HH. She returns for f/u. She is doing much better off of the [address]. She is enjoying her first floor bedroom, her daughter moved her bed to the den. Her balance is better, no falls. PT and OT dismissed her. Her GI specialist would like her to come off of Plavix for a colonoscopy, but she does not want to stop the Plavix for a week. He left it up to the pt as to whether or not she had colonoscopy, he apparently had low concern for any serious problem and she has decided not to have the procedure. She is taking Lamictal 50mg bid, they got slightly off schedule with titration. Was using CBD oil and asks if she should continue using it, off of it for 2-3 weeks now. She is still in ST, ends in a couple of weeks. She had her back xray yesterday and has a chronic compression fx from a fall in the past, unknown when. She got a cortisone shot to right knee. Has tendonitis in left knee. Past Medical History:   Diagnosis Date    Anxiety     anxiety    Diabetes ([address]     Hypercholesterolemia     Hypertension     Localization-related symptomatic epilepsy and epileptic syndromes with complex partial seizures, intractable, without status epilepticus ([facility]     Stroke ([facility]     Left MCA CVA 2013      Past Surgical History:   Procedure Laterality Date    HX  SECTION      x 3    HX GYN      2 masses removed from ovaries, noncancerous    HX HERNIA REPAIR      HX HYSTERECTOMY        Social History     Social History    Marital status:      Spouse name: N/A    Number of children: N/A    Years of education: N/A     Occupational History    Retired      Social History Main Topics    Smoking status: Never Smoker    Smokeless tobacco: Never Used    Alcohol use No    Drug use: No    Sexual activity: Not on file     Other Topics Concern    Not on file     Social History Narrative    Lives with a roommate     Family History   Problem Relation Age of Onset    Adopted:  Yes Objective:   ROS:  Per HPI or PMH, o/w neg    Allergies   Allergen Reactions    Aspirin Swelling     Periorbital swelling    Codeine Angioedema    Nsaids (Non-Steroidal Anti-Inflammatory Drug) Nausea and Vomiting    Pcn [Penicillins] Swelling       Meds:  Outpatient Medications Prior to Visit   Medication Sig Dispense Refill    carBAMazepine ER (CARBATROL ER) 300 mg capsule Take 2 Caps by mouth two (2) times a day. 360 Cap 3    lamoTRIgine (LAMICTAL) 100 mg tablet Follow titration schedule to initial goal dose of 100mg bid (Patient taking differently: 50 mg two (2) times a day. Follow titration schedule to initial goal dose of 100mg bid) 180 Tab 0    glimepiride (AMARYL) 2 mg tablet TAKE 1 TABLET BY MOUTH EVERY DAY  3    venlafaxine-SR (EFFEXOR-XR) 75 mg capsule TAKE 1 CAPSULE BY MOUTH DAILY. 90 Cap 3    hydrALAZINE (APRESOLINE) 25 mg tablet Take 1 Tab by mouth two (2) times a day. 60 Tab 0    verapamil CR (VERELAN) 360 mg CR capsule Take 360 mg by mouth daily.  LORazepam (ATIVAN) 0.5 mg tablet Take 2 Tabs by mouth every eight (8) hours as needed for Anxiety. Max Daily Amount: 3 mg. 15 Tab 0    atorvastatin (LIPITOR) 40 mg tablet Take 40 mg by mouth nightly.  metFORMIN ER (GLUCOPHAGE XR) 500 mg tablet Take 2,000 mg by mouth daily (with dinner).  valsartan-hydrochlorothiazide (DIOVAN-HCT) 320-25 mg per tablet Take 1 Tab by mouth daily. Indications: HYPERTENSION 30 Tab 0    clopidogrel (PLAVIX) 75 mg tablet Take 1 Tab by mouth daily. 30 Tab 1    cloBAZam (ONFI) 10 mg tab tablet Take 1/2 tab by mouth daily x 1 week, then take 1/2 tab twice a day for 1 week, then take 1/2 tab in AM and 1 tab in PM x 1 week, then 1 tab twice a day 180 Tab 1     No facility-administered medications prior to visit. Imaging:  MRI Results (most recent):    Results from Hospital Encounter encounter on 02/21/14   MRA BRAIN WO CONT   Narrative **Final Report**      ICD Codes / Adm. Diagnosis: 435.9 872014 / Unspecified transient cerebral    Urinary Incontinence  Examination:  MR HEAD MRA WO CON  - [id number] - Feb 21 2014  2:34PM  Accession No:  [id number]  Reason:        REPORT:  INDICATION:  Right facial droop    EXAMINATION:  MRI BRAIN WITH/WITHOUT CONTRAST, MRA HEAD WO CONTRAST    COMPARISON:  CT head earlier today    TECHNIQUE:  MR imaging of the brain was performed with sagittal T1, axial   T1, T2, FLAIR, GRE, DWI/ADC; pre and post contrast multiplanar T1 utilizing   7 mL Gadavist.  3 D time of flight MRA of the head was performed. FINDINGS:    The ventricles are midline without hydrocephalus. There is no acute intra   or extra-axial fluid collection. There is encephalomalacia in the anterior   left middle cerebral artery territory, with associated left sided while area   and degeneration. There is a punctate remote lacunar infarction within the   left cerebellum. There is no acute infarction. The major intracranial   vascular flow-voids are patent. There is no abnormal parenchymal or   meningeal enhancement. Basilar artery is diminutive due to bilateral posterior communicating   arteries. There is mild irregularity of the bilateral posterior cerebral   arteries. Internal carotid arteries are patent. There is focal irregularity   and narrowing at the left distal M1 segment, with minimal flow in the   anterior division of the left middle cerebral artery ranges. There is also   mild irregularity throughout the bilateral anterior cerebral arteries. There   is no aneurysm. IMPRESSION:  Encephalomalacia anterior left middle cerebral artery territory with   associated left sided Wallerian degeneration. Diminished flow anterior   division left MCA branches, felt to be chronic. No acute infarction.              Signing/Reading Doctor: Juana Hyde ([id number])    Approved: Juana Hyde ([id number])  Feb 21 2014  3:35PM                                    CT Results (most recent):    Results from [facility] Encounter encounter on 01/04/15   CT CODE NEURO HEAD WO CONTRAST   Narrative **Final Report**      ICD Codes / Adm. Diagnosis: [phone] / Stroke  STROKE  Examination:  CT CODE NEURO HEAD WO CON  - [id number] - Jan 4 2015  9:08PM  Accession No:  [id number]  Reason:  aphasia, right droop      REPORT:  INDICATION: Stroke. Aphasia. FINDINGS: 5 mm axial images were obtained from the skull base through the   vertex. The ventricles and cortical sulci are appropriate in size and   configuration. There is stable encephalomalacia in the left frontal lobe. There is no evidence of intracranial hemorrhage, mass, mass effect, or acute   infarct. No extra-axial fluid collections are seen. The visualized paranasal   sinuses and mastoid air cells are clear. The orbital structures are   unremarkable. No osseous abnormalities are seen. IMPRESSION: Stable left frontal lobe encephalomalacia, compatible with old   infarct. No evidence of acute infarct, intracranial hemorrhage, or   intracranial mass. Signing/Reading Doctor: Idalia Rhodes ([id number])    Approved: Idalia Rhodes ([id number])  Jan 4 2015  9:19PM                                      Reviewed records in [facility] and media tab today    Lab Review   Results for orders placed or performed in visit on 41/30/07   METABOLIC PANEL, COMPREHENSIVE   Result Value Ref Range    Glucose 142 (H) 65 - 99 mg/dL    BUN 16 6 - 24 mg/dL    Creatinine 0.56 (L) 0.57 - 1.00 mg/dL    GFR est non- >59 mL/min/1.73    GFR est  >59 mL/min/1.73    BUN/Creatinine ratio 29 (H) 9 - 23    Sodium 142 134 - 144 mmol/L    Potassium 4.4 3.5 - 5.2 mmol/L    Chloride 102 96 - 106 mmol/L    CO2 24 18 - 29 mmol/L    Calcium 8.9 8.7 - 10.2 mg/dL    Protein, total 6.5 6.0 - 8.5 g/dL    Albumin 4.2 3.5 - 5.5 g/dL    GLOBULIN, TOTAL 2.3 1.5 - 4.5 g/dL    A-G Ratio 1.8 1.2 - 2.2    Bilirubin, total <0.2 0.0 - 1.2 mg/dL    Alk.  phosphatase 84 39 - 117 IU/L    AST (SGOT) 14 0 - 40 IU/L    ALT (SGPT) 16 0 - 32 IU/L   CBC WITH AUTOMATED DIFF   Result Value Ref Range    WBC 7.6 3.4 - 10.8 x10E3/uL    RBC 4.15 3.77 - 5.28 x10E6/uL    HGB 12.0 11.1 - 15.9 g/dL    HCT 36.4 34.0 - 46.6 %    MCV 88 79 - 97 fL    MCH 28.9 26.6 - 33.0 pg    MCHC 33.0 31.5 - 35.7 g/dL    RDW 13.4 12.3 - 15.4 %    PLATELET 314 617 - 849 x10E3/uL    NEUTROPHILS 63 Not Estab. %    Lymphocytes 28 Not Estab. %    MONOCYTES 6 Not Estab. %    EOSINOPHILS 3 Not Estab. %    BASOPHILS 0 Not Estab. %    ABS. NEUTROPHILS 4.7 1.4 - 7.0 x10E3/uL    Abs Lymphocytes 2.2 0.7 - 3.1 x10E3/uL    ABS. MONOCYTES 0.5 0.1 - 0.9 x10E3/uL    ABS. EOSINOPHILS 0.3 0.0 - 0.4 x10E3/uL    ABS. BASOPHILS 0.0 0.0 - 0.2 x10E3/uL    IMMATURE GRANULOCYTES 0 Not Estab. %    ABS. IMM. GRANS. 0.0 0.0 - 0.1 x10E3/uL   CARBAMAZEPINE   Result Value Ref Range    Carbamazepine 10.8 4.0 - 12.0 ug/mL        Exam:  Visit Vitals    /84    Pulse (!) 105    Resp 18    Wt 80.7 kg (178 lb)    SpO2 98%    BMI 29.62 kg/m2       General:  Alert, cooperative, no distress. Head:  Normocephalic, without obvious abnormality, atraumatic. Respiratory:  Heart:   Non labored breathing  Regular rhythm, tachycardic,  no murmurs       Extremities: Warm, no cyanosis or edema. Pulses: 2+ radial pulses. Neurologic:  MS: Alert and oriented x 4, speech intact. Language - word finding. Attention and fund of knowledge appropriate. Recent and remote memory intact.     Exam 3/21/18:  Cranial Nerves:  II: visual fields VFF   II: pupils    II: optic disc    III,VII: ptosis none   III,IV,VI: extraocular muscles  EOMI, no nystagmus or diplopia   V: facial light touch sensation     VII: facial muscle function   symmetric   VIII: hearing intact   IX: soft palate elevation     XI: trapezius strength     XI: sternocleidomastoid strength    XII: tongue       Motor: normal bulk and tone, no tremor              Strength: 5/5 throughout, no PD    Coordination: FTN and MARIA GUADALUPE intact  Gait: normal gait  Reflexes: 2+ symmetric           Assessment/Plan   Pt is a 59 y.o. left handed female with refractroy focal onset epilepsy and h/o left MCA CVA on 7/17/13 with partial aphasia, right facial weakness and right upper extremity weakness, with persistent difficulties with language. Pt had risk factors of uncontrolled HTN, DM, hypercholesterolemia, and medical noncompliance at the time of stroke. New onset seizure 2/21/14, probable focal onset seizure with secondary generalization with left hemisphere onset due to stroke. Has also had a cluster of focal onset seizures without DEMAR/LOC. Having ongoing breakthrough seizures, failed Vimpat, Keppra - mood changes, Fycompa, and Onfi - ineffective and possible imbalance. Exam with elevated /84, mild expressive aphasia, o/w non-focal.    -Increase Lamictal to 100/100mg  -Continue Carbamazepine 300mg tabs, 2 tabs twice a day  -Check carbamazepine and Lamictal levels on 5/9/18  -CMP and CBC with diff on 5/9/18 to monitor for toxicity especially after taking CBD oil  -Pt is instructed to not take CBD oil due to potential toxic interactions with Lamictal and Carbamazepine, liver toxicity, and it does not have known efficacy for her type of epilepsy.  -Continue Effexor XR 75mg daily.   -Continue Plavix 75mg daily. -F/u in clinic in 3 months, instructed to call in the interim if needed. ICD-10-CM ICD-9-CM    1. Localization-related symptomatic epilepsy and epileptic syndromes with complex partial seizures, intractable, without status epilepticus ([facility] [address] O44.395 771.58 METABOLIC PANEL, COMPREHENSIVE      CBC WITH AUTOMATED DIFF      LAMOTRIGINE (LAMICTAL)      CARBAMAZEPINE   2. Cerebral infarction, unspecified mechanism (HCC) S42.9 488.63 METABOLIC PANEL, COMPREHENSIVE      CBC WITH AUTOMATED DIFF      LAMOTRIGINE (LAMICTAL)      CARBAMAZEPINE   3.  Aphasia as late effect of cerebrovascular accident O13.764 177.88 METABOLIC PANEL, COMPREHENSIVE      CBC WITH AUTOMATED DIFF      LAMOTRIGINE (LAMICTAL)      CARBAMAZEPINE   4. Depression, unspecified depression type L93.8 092 METABOLIC PANEL, COMPREHENSIVE      CBC WITH AUTOMATED DIFF      LAMOTRIGINE (LAMICTAL)      CARBAMAZEPINE       Signed:   Kavon Castro MD  4/25/2018

## 2018-04-25 NOTE — MR AVS SNAPSHOT
[facility] [address] 
[phone] Patient: Cami James MRN: [id number] BLL:[birth year] Visit Information Date & Time Provider Department Dept. Phone Encounter #  
 4/25/2018 10:00 AM Nupur Brown MD [facility] [place] [address] [phone] Follow-up Instructions Return in about 3 months (around 7/25/2018). Upcoming Health Maintenance Date Due Hepatitis C Screening 1960 FOOT EXAM Q1 9/12/1968 EYE EXAM RETINAL OR DILATED Q1 9/12/1968 Pneumococcal 19-64 Medium Risk (1 of 1 - PPSV23) 9/12/1977 DTaP/Tdap/Td series (1 - Tdap) 9/12/1979 PAP AKA CERVICAL CYTOLOGY 9/12/1979 BREAST CANCER SCRN MAMMOGRAM 9/12/2008 FOBT Q 1 YEAR AGE 50-75 9/12/2008 Influenza Age 5 to Adult 8/1/2017 MEDICARE YEARLY EXAM 3/22/2018 HEMOGLOBIN A1C Q6M 7/13/2018 MICROALBUMIN Q1 1/13/2019 LIPID PANEL Q1 1/13/2019 Allergies as of 4/25/2018  Review Complete On: 4/25/2018 By: Nupur Brown MD  
  
 Severity Noted Reaction Type Reactions Aspirin  02/21/2014   Systemic Swelling Periorbital swelling Codeine  07/14/2013    Angioedema Nsaids (Non-steroidal Anti-inflammatory Drug)  07/14/2013    Nausea and Vomiting Pcn [Penicillins]  07/14/2013    Swelling Current Immunizations  Reviewed on 1/6/2015 No immunizations on file. Not reviewed this visit You Were Diagnosed With   
  
 Codes Comments Localization-related symptomatic epilepsy and epileptic syndromes with complex partial seizures, intractable, without status epilepticus ([facility]    -  Primary ICD-10-CM: Z18.223 ICD-9-CM: 345.41 Cerebral infarction, unspecified mechanism ([facility]     ICD-10-CM: I63.9 ICD-9-CM: 434.91 Aphasia as late effect of cerebrovascular accident     ICD-10-CM: I69.320 ICD-9-CM: 438.11 Depression, unspecified depression type     ICD-10-CM: F32.9 ICD-9-CM: 714 Vitals BP Pulse Resp Weight(growth percentile) SpO2 BMI  
 152/84 (!) 105 18 178 lb (80.7 kg) 98% 29.62 kg/m2 OB Status Smoking Status Postmenopausal Never Smoker BMI and BSA Data Body Mass Index Body Surface Area  
 29.62 kg/m 2 1.92 m 2 Preferred Pharmacy Pharmacy Name Phone [id number] [place], [place] - [address] [phone] Your Updated Medication List  
  
   
This list is accurate as of 4/25/18 10:43 AM.  Always use your most recent med list.  
  
  
  
  
 carBAMazepine  mg capsule Commonly known as:  CARBATROL ER Take 2 Caps by mouth two (2) times a day. cloBAZam 10 mg Tab tablet Commonly known as:  ONFI Take 1/2 tab by mouth daily x 1 week, then take 1/2 tab twice a day for 1 week, then take 1/2 tab in AM and 1 tab in PM x 1 week, then 1 tab twice a day  
  
 clopidogrel 75 mg Tab Commonly known as:  PLAVIX Take 1 Tab by mouth daily. glimepiride 2 mg tablet Commonly known as:  AMARYL  
TAKE 1 TABLET BY MOUTH EVERY DAY  
  
 glucophage  mg tablet Generic drug:  metFORMIN ER Take 2,000 mg by mouth daily (with dinner). hydrALAZINE 25 mg tablet Commonly known as:  APRESOLINE Take 1 Tab by mouth two (2) times a day. lamoTRIgine 100 mg tablet Commonly known as: LaMICtal  
Follow titration schedule to initial goal dose of 100mg bid LIPITOR 40 mg tablet Generic drug:  atorvastatin Take 40 mg by mouth nightly. LORazepam 0.5 mg tablet Commonly known as:  ATIVAN Take 2 Tabs by mouth every eight (8) hours as needed for Anxiety. Max Daily Amount: 3 mg.  
  
 valsartan-hydroCHLOROthiazide 320-25 mg per tablet Commonly known as:  DIOVAN-HCT Take 1 Tab by mouth daily. Indications: HYPERTENSION  
  
 venlafaxine-SR 75 mg capsule Commonly known as:  EFFEXOR-XR  
TAKE 1 CAPSULE BY MOUTH DAILY. verapamil  mg ER capsule Commonly known as:  August Sibley Take 360 mg by mouth daily. We Performed the Following CARBAMAZEPINE [id number] CPT(R)] CBC WITH AUTOMATED DIFF [51005 CPT(R)] LAMOTRIGINE (LAMICTAL) [23155 CPT(R)] METABOLIC PANEL, COMPREHENSIVE [07110 CPT(R)] Follow-up Instructions Return in about 3 months (around 7/25/2018). Patient Instructions A Healthy Lifestyle: Care Instructions Your Care Instructions A healthy lifestyle can help you feel good, stay at a healthy weight, and have plenty of energy for both work and play. A healthy lifestyle is something you can share with your whole family. A healthy lifestyle also can lower your risk for serious health problems, such as high blood pressure, heart disease, and diabetes. You can follow a few steps listed below to improve your health and the health of your family. Follow-up care is a key part of your treatment and safety. Be sure to make and go to all appointments, and call your doctor if you are having problems. It's also a good idea to know your test results and keep a list of the medicines you take. How can you care for yourself at home? · Do not eat too much sugar, fat, or fast foods. You can still have dessert and treats now and then. The goal is moderation. · Start small to improve your eating habits. Pay attention to portion sizes, drink less juice and soda pop, and eat more fruits and vegetables. ¨ Eat a healthy amount of food. A 3-ounce serving of meat, for example, is about the size of a deck of cards. Fill the rest of your plate with vegetables and whole grains. ¨ Limit the amount of soda and sports drinks you have every day. Drink more water when you are thirsty. ¨ Eat at least 5 servings of fruits and vegetables every day.  It may seem like a lot, but it is not hard to reach this goal. A serving or helping is 1 piece of fruit, 1 cup of vegetables, or 2 cups of leafy, raw vegetables. Have an apple or some carrot sticks as an afternoon snack instead of a candy bar. Try to have fruits and/or vegetables at every meal. 
· Make exercise part of your daily routine. You may want to start with simple activities, such as walking, bicycling, or slow swimming. Try to be active 30 to 60 minutes every day. You do not need to do all 30 to 60 minutes all at once. For example, you can exercise 3 times a day for 10 or 20 minutes. Moderate exercise is safe for most people, but it is always a good idea to talk to your doctor before starting an exercise program. 
· Keep moving. Juvencio Mass the lawn, work in the garden, or [employer]. Take the stairs instead of the elevator at work. · If you smoke, quit. People who smoke have an increased risk for heart attack, stroke, cancer, and other lung illnesses. Quitting is hard, but there are ways to boost your chance of quitting tobacco for good. ¨ Use nicotine gum, patches, or lozenges. ¨ Ask your doctor about stop-smoking programs and medicines. ¨ Keep trying. In addition to reducing your risk of diseases in the future, you will notice some benefits soon after you stop using tobacco. If you have shortness of breath or asthma symptoms, they will likely get better within a few weeks after you quit. · Limit how much alcohol you drink. Moderate amounts of alcohol (up to 2 drinks a day for men, 1 drink a day for women) are okay. But drinking too much can lead to liver problems, high blood pressure, and other health problems. Family health If you have a family, there are many things you can do together to improve your health. · Eat meals together as a family as often as possible. · Eat healthy foods. This includes fruits, vegetables, lean meats and dairy, and whole grains. · Include your family in your fitness plan.  Most people think of activities such as jogging or tennis as the way to fitness, but there are many ways you and your family can be more active. Anything that makes you breathe hard and gets your heart pumping is exercise. Here are some tips: 
¨ Walk to do errands or to take your child to school or the bus. ¨ Go for a family bike ride after dinner instead of watching TV. Where can you learn more? Go to http://hansel-jose carlos.info/. Enter C816 in the search box to learn more about \"A Healthy Lifestyle: Care Instructions. \" Current as of: May 12, 2017 Content Version: 11.4 © 6075-1642 [employer]. Care instructions adapted under license by [employer] (which disclaims liability or warranty for this information). If you have questions about a medical condition or this instruction, always ask your healthcare professional. [address] any warranty or liability for your use of this information. Introducing [facility]! Kenneth Kraus introduces NewsPin patient portal. Now you can access parts of your medical record, email your doctor's office, and request medication refills online. 1. In your internet browser, go to [email or web address] 2. Click on the First Time User? Click Here link in the Sign In box. You will see the New Member Sign Up page. 3. Enter your NewsPin Access Code exactly as it appears below. You will not need to use this code after youve completed the sign-up process. If you do not sign up before the expiration date, you must request a new code. · NewsPin Access Code: [email or web address] Expires: 4/29/2018  2:44 PM 
 
4. Enter the last four digits of your Social Security Number (xxxx) and Date of Birth (mm/dd/yyyy) as indicated and click Submit. You will be taken to the next sign-up page. 5. Create a NewsPin ID.  This will be your NewsPin login ID and cannot be changed, so think of one that is secure and easy to remember. 6. Create a [employer] password. You can change your password at any time. 7. Enter your Password Reset Question and Answer. This can be used at a later time if you forget your password. 8. Enter your e-mail address. You will receive e-mail notification when new information is available in [address] 9. Click Sign Up. You can now view and download portions of your medical record. 10. Click the Download Summary menu link to download a portable copy of your medical information. If you have questions, please visit the Frequently Asked Questions section of the [employer] website. Remember, [employer] is NOT to be used for urgent needs. For medical emergencies, dial 911. Now available from your iPhone and Android! Please provide this summary of care documentation to your next provider. Your primary care clinician is listed as Tisha Munoz. If you have any questions after today's visit, please call [phone].

## 2018-05-02 ENCOUNTER — TELEPHONE (OUTPATIENT)
Dept: NEUROLOGY | Age: 58
End: 2018-05-02

## 2018-05-02 NOTE — TELEPHONE ENCOUNTER
Pt just got done home from an appt with her PCP. She stated her pcp thinks there is a 70% chance that she has shingles, so the pt is starting antibiotics. Pt's PCP is unfamiliar with the medication Lamictal and is wondering if that could have caused the rash. Please call back.

## 2018-05-03 LAB
ALBUMIN SERPL-MCNC: 4.4 G/DL (ref 3.5–5.5)
ALBUMIN/GLOB SERPL: 2 {RATIO} (ref 1.2–2.2)
ALP SERPL-CCNC: 91 IU/L (ref 39–117)
ALT SERPL-CCNC: 21 IU/L (ref 0–32)
AST SERPL-CCNC: 16 IU/L (ref 0–40)
BASOPHILS # BLD AUTO: 0 X10E3/UL (ref 0–0.2)
BASOPHILS NFR BLD AUTO: 0 %
BILIRUB SERPL-MCNC: <0.2 MG/DL (ref 0–1.2)
BUN SERPL-MCNC: 15 MG/DL (ref 6–24)
BUN/CREAT SERPL: 23 (ref 9–23)
CALCIUM SERPL-MCNC: 9.6 MG/DL (ref 8.7–10.2)
CARBAMAZEPINE SERPL-MCNC: 13.3 UG/ML (ref 4–12)
CHLORIDE SERPL-SCNC: 95 MMOL/L (ref 96–106)
CO2 SERPL-SCNC: 27 MMOL/L (ref 18–29)
CREAT SERPL-MCNC: 0.66 MG/DL (ref 0.57–1)
EOSINOPHIL # BLD AUTO: 0.1 X10E3/UL (ref 0–0.4)
EOSINOPHIL NFR BLD AUTO: 1 %
ERYTHROCYTE [DISTWIDTH] IN BLOOD BY AUTOMATED COUNT: 14.1 % (ref 12.3–15.4)
GFR SERPLBLD CREATININE-BSD FMLA CKD-EPI: 112 ML/MIN/1.73
GFR SERPLBLD CREATININE-BSD FMLA CKD-EPI: 97 ML/MIN/1.73
GLOBULIN SER CALC-MCNC: 2.2 G/DL (ref 1.5–4.5)
GLUCOSE SERPL-MCNC: 237 MG/DL (ref 65–99)
HCT VFR BLD AUTO: 36.9 % (ref 34–46.6)
HGB BLD-MCNC: 12 G/DL (ref 11.1–15.9)
IMM GRANULOCYTES # BLD: 0 X10E3/UL (ref 0–0.1)
IMM GRANULOCYTES NFR BLD: 0 %
LAMOTRIGINE SERPL-MCNC: 1.3 UG/ML (ref 2–20)
LYMPHOCYTES # BLD AUTO: 2.6 X10E3/UL (ref 0.7–3.1)
LYMPHOCYTES NFR BLD AUTO: 32 %
MCH RBC QN AUTO: 27.6 PG (ref 26.6–33)
MCHC RBC AUTO-ENTMCNC: 32.5 G/DL (ref 31.5–35.7)
MCV RBC AUTO: 85 FL (ref 79–97)
MONOCYTES # BLD AUTO: 0.4 X10E3/UL (ref 0.1–0.9)
MONOCYTES NFR BLD AUTO: 5 %
NEUTROPHILS # BLD AUTO: 5.1 X10E3/UL (ref 1.4–7)
NEUTROPHILS NFR BLD AUTO: 62 %
PLATELET # BLD AUTO: 394 X10E3/UL (ref 150–379)
POTASSIUM SERPL-SCNC: 4.1 MMOL/L (ref 3.5–5.2)
PROT SERPL-MCNC: 6.6 G/DL (ref 6–8.5)
RBC # BLD AUTO: 4.34 X10E6/UL (ref 3.77–5.28)
SODIUM SERPL-SCNC: 140 MMOL/L (ref 134–144)
WBC # BLD AUTO: 8.3 X10E3/UL (ref 3.4–10.8)

## 2018-05-04 NOTE — TELEPHONE ENCOUNTER
Returned pt's phone call after finding this open phone note while looking at recent labs. She developed a rash a week ago, located on right abdomen only, no pain, flat rash, only 7 bumps. She is being treated for shingles by her PCP. She was told that he was 70% sure it was shingles and 30% possibility of rash to Lamictal.     She is instructed to call if her rash spreads or worsens. I was going to have her increase her Lamictal and decrease carbamazepine slightly based on levels, but now will wait to make certain this rash is shingles. She is instructed to call with an update next Thursday, May 9th, if stable or improved then will change AED doses.

## 2018-05-10 ENCOUNTER — TELEPHONE (OUTPATIENT)
Dept: NEUROLOGY | Age: 58
End: 2018-05-10

## 2018-05-10 NOTE — TELEPHONE ENCOUNTER
----- Message from Charlene Sierra sent at 5/10/2018 10:56 AM EDT -----  Regarding: Dr Fuentes/Telephone  Pt returning phone call.     Contact [phone]

## 2018-05-14 ENCOUNTER — TELEPHONE (OUTPATIENT)
Dept: NEUROLOGY | Age: 58
End: 2018-05-14

## 2018-05-14 NOTE — TELEPHONE ENCOUNTER
MESSAGE FROM [facility]    ----- Message from Flory Hurd sent at 5/14/2018 11:21 AM EDT -----  Regarding: Dr Fuentes/telephone  Ms Tigre Monroe pt's care taker [phone], pt  Cell phone number if calling after 3:00pm [phone], said Ms. Neeru Braun had a seizure yesterday around 8:00am, the first one she had in 3-4  weeks, was not a grandma seizur. This is for FYI purposes only. Would like the nurse to call back to touch sierra.

## 2018-05-15 NOTE — TELEPHONE ENCOUNTER
----- Message from Melinda Kaufman sent at 5/15/2018  2:03 PM EDT -----  Regarding: Dr. Ruby Iglesias, caregiver would like a call back from Dr. Tesfaye Husain or the nurse regarding the pt having a seizure on Sunday. Some med changes have been done, so the pt wants to notify Dr. Tesfaye Husain to make sure the seizure was not caused by the med changes. Pt had another issue that she wanted to discuss with her . Pastor Alexander can be reached  At [phone], until 4pm today or until 4:30pm tomorrow.

## 2018-05-18 NOTE — TELEPHONE ENCOUNTER
Her carbamazepine level was slightly high which would be okay.  Her lamotrigine level was low and I would recommend increasing the dose to 150 mg twice a day if she is currently taking 100 mg twice a day.  Continue carbamazepine same dose    I spoke with pt and directed her to increase her Lamotrigine to 150mg BID as directed by Dr Zena Dubon. Alexandre Hill

## 2018-05-18 NOTE — TELEPHONE ENCOUNTER
Pt calling again. She said she has not heard anything back from the nurse and is very upset. PSR told her that the nurse left two messages on her caregiver's phone. The messages were not relayed to the pt. Please call the pt back.

## 2018-05-30 ENCOUNTER — TELEPHONE (OUTPATIENT)
Dept: NEUROLOGY | Age: 58
End: 2018-05-30

## 2018-05-30 NOTE — TELEPHONE ENCOUNTER
----- Message from Polo Duarte sent at 5/30/2018 10:18 AM EDT -----  Regarding: Dr. Sima Carias  Pt is requesting a call if someone cancels or is a no show to their appointment. Pt would like an earlier appointment than the one she has which is 7- at 11:40am. Pt's best contact number is [phone] or call her caregiver Jeanne Comp [phone] (C).  Pt is requesting Rx clarification on her Lamictal, as well because it has been changed after her seizure which after mother's day when she had her seizure

## 2018-05-31 NOTE — TELEPHONE ENCOUNTER
----- Message from Ventura Lanza sent at 5/31/2018  9:05 AM EDT -----  Regarding: Dr. Erasmo Silvestre  Pt request for a call back from the practice. She missed a previous call yesterday 5/30. Best contact number is [phone].

## 2018-05-31 NOTE — TELEPHONE ENCOUNTER
Advised pt Lamictal 150mg BID. Also advised that I have placed her on a cancellation list and will attempt to get her a sooner appt.

## 2018-06-06 ENCOUNTER — TELEPHONE (OUTPATIENT)
Dept: NEUROLOGY | Age: 58
End: 2018-06-06

## 2018-06-06 RX ORDER — LAMOTRIGINE 150 MG/1
150 TABLET ORAL 2 TIMES DAILY
Qty: 180 TAB | Refills: 1 | Status: SHIPPED | OUTPATIENT
Start: 2018-06-06 | End: 2018-06-19 | Stop reason: SDUPTHER

## 2018-06-06 NOTE — TELEPHONE ENCOUNTER
Pt said Dr. Renita Eden increased her [address] and she needs it refilled but the instructions that were sent to the pharmacy are different. Please call back with clarification.

## 2018-06-06 NOTE — TELEPHONE ENCOUNTER
Dr. Kaitlin Braun, per Dr. Gilles Haji telephone encounter, 5/14/18, pt is to be on Lamictal 150mg bid. I informed pt. Pt requesting a med refill. Please advise.

## 2018-06-06 NOTE — TELEPHONE ENCOUNTER
Francisco Orozco I sent a 90 day supply of Lamictal 150mg tabs (this is a change from the 100mg tabs), take 1 tab twice a day as directed by Dr. Dat Jorge on 5/14/18.

## 2018-06-11 ENCOUNTER — TELEPHONE (OUTPATIENT)
Dept: NEUROLOGY | Age: 58
End: 2018-06-11

## 2018-06-11 NOTE — TELEPHONE ENCOUNTER
Calling to let Dr. Eliza Trent know she had a seizure yesterday around 6 but is doing okay. Please call back.

## 2018-06-11 NOTE — TELEPHONE ENCOUNTER
I spoke with patient and she had one seizure yesterday. No one witnessed it. No further seizure activity today. Dr. Sheets Fears, please advise.

## 2018-06-19 RX ORDER — LAMOTRIGINE 150 MG/1
TABLET ORAL
Qty: 225 TAB | Refills: 1 | Status: SHIPPED | OUTPATIENT
Start: 2018-06-19 | End: 2019-01-24 | Stop reason: SDUPTHER

## 2018-06-19 NOTE — TELEPHONE ENCOUNTER
Michele - Please call pt: I recommend we increase her Lamictal dose. She should be taking Lamictal 150mg bid currently, please verify. If that is correct, then have her increase to 150mg in AM and 225mg in PM.  Since she has 150mg tabs at home, that will be 1 tab in AM and 1.5 tabs in PM. New Rx sent.  (You might also call her daughter to make certain the instructions are understood.)

## 2018-06-20 NOTE — TELEPHONE ENCOUNTER
Dave London is unsure of the dosing. She plans to go to her mother's house, get the exact dose and call me back.

## 2018-06-20 NOTE — TELEPHONE ENCOUNTER
Willard Bañuelos - Please call her daughter to verify dosing. Her Lamictal was just increased to 150mg bid on 5/14/18 by Dr. Mamie Silveira and I sent in a Rx for 150mg tabs bid on 6/6/18. I wonder if she is taking 100mg tabs, 1.5 tabs bid. Please have them clarify this very clearly.

## 2018-06-21 NOTE — TELEPHONE ENCOUNTER
Is that what the bottle says? I am not certain how that happened, but keep taking it that way. If that is what the bottle says, I will change the Rx to reflect that.

## 2018-06-21 NOTE — TELEPHONE ENCOUNTER
Pt's daughter calling back. She said her mother has been taking Lamotrigine 150 mg 1.5 tablets, twice a day.  Please call back

## 2018-06-22 NOTE — TELEPHONE ENCOUNTER
I called the pt: She has the 100mg tabs at home and she is taking 1.5 tabs bid as directed by Dr. Eva Welch on 5/14/18. I instructed her to increase the dose:  Continue 1.5 tabs in AM (150mg) and 2 tabs in PM (200) until she runs out of her current pills (just received a 90day supply). When she refills her Rx it will be for the 150mg tabs and she should take 1 tab in AM (150mg) and 1.5 tabs in PM (225mg) as stated on new bottle.

## 2018-06-26 ENCOUNTER — TELEPHONE (OUTPATIENT)
Dept: NEUROLOGY | Age: 58
End: 2018-06-26

## 2018-06-26 NOTE — TELEPHONE ENCOUNTER
Braulio Damaris is not on HIPPA form. I called pt and reviewed Lamictal instructions. 150mg 1 tab in AM and 1.5 tab in PM. Pt wrote out directions and read them back to me.

## 2018-06-26 NOTE — TELEPHONE ENCOUNTER
----- Message from Liz Coreas sent at 6/26/2018  3:41 PM EDT -----  Regarding: Dr. Tino Medina  The patient's daughter Mellissa Peterson is requesting a call back from the nurse to clarify how that doctor wants the patient to take a medication. Ms. Morton Cousins stated that she does not know the name of the medication but the nurse called the patient the other day about it.  (h)[phone]

## 2018-06-27 ENCOUNTER — TELEPHONE (OUTPATIENT)
Dept: NEUROLOGY | Age: 58
End: 2018-06-27

## 2018-06-27 NOTE — TELEPHONE ENCOUNTER
I reviewed Lamictal instructions with patient. She is to take 150mg in AM and 225mg in PM. Patient read these instructions back to me.

## 2018-06-27 NOTE — TELEPHONE ENCOUNTER
----- Message from [facility] sent at 2018 12:33 PM EDT -----  Regarding: Dr. Kodak Nance: [phone]  Pt is requesting a call from the nurse because pt needs clarification on the dosage of her medication and if the instructions for taking the medication is correct? Please leave a detailed message if the call is missed. Pt best [phone].

## 2018-06-28 ENCOUNTER — TELEPHONE (OUTPATIENT)
Dept: NEUROLOGY | Age: 58
End: 2018-06-28

## 2018-06-28 NOTE — TELEPHONE ENCOUNTER
This matter was discussed with Dr. Elias Jacques and she agrees that patient should resume her correct dose and no further action is required at this time.

## 2018-06-28 NOTE — TELEPHONE ENCOUNTER
Patient called stating she accidentally took Lamictal 225mg BID when she was supposed to take 150mg in AM and 225mg in PM. She has taken the med incorrectly for a few days. She hasn't had any new side effects. We reviewed the correct instructions and patient agrees to take it as ordered. I have also reviewed the instructions with her daughter.

## 2018-07-25 ENCOUNTER — DOCUMENTATION ONLY (OUTPATIENT)
Dept: NEUROLOGY | Age: 58
End: 2018-07-25

## 2018-07-25 ENCOUNTER — OFFICE VISIT (OUTPATIENT)
Dept: NEUROLOGY | Age: 58
End: 2018-07-25

## 2018-07-25 VITALS
WEIGHT: 172 LBS | BODY MASS INDEX: 28.66 KG/M2 | DIASTOLIC BLOOD PRESSURE: 82 MMHG | OXYGEN SATURATION: 96 % | HEIGHT: 65 IN | HEART RATE: 93 BPM | SYSTOLIC BLOOD PRESSURE: 162 MMHG | RESPIRATION RATE: 16 BRPM

## 2018-07-25 DIAGNOSIS — G40.219 LOCALIZATION-RELATED SYMPTOMATIC EPILEPSY AND EPILEPTIC SYNDROMES WITH COMPLEX PARTIAL SEIZURES, INTRACTABLE, WITHOUT STATUS EPILEPTICUS (HCC): Primary | ICD-10-CM

## 2018-07-25 DIAGNOSIS — I63.9 CEREBRAL INFARCTION, UNSPECIFIED MECHANISM (HCC): ICD-10-CM

## 2018-07-25 DIAGNOSIS — I69.320 APHASIA AS LATE EFFECT OF CEREBROVASCULAR ACCIDENT: ICD-10-CM

## 2018-07-25 NOTE — PROGRESS NOTES
Neurology Progress Note    HISTORY PROVIDED BY: patient, daughter    Chief Complaint:   Chief Complaint   Patient presents with    Epilepsy      Subjective:   Pt is a 59 y.o. left handed female last seen in clinic on 4/25/18 in f/u for refractroy focal onset epilepsy and h/o left MCA CVA on 7/17/13 with partial aphasia, right facial weakness and right upper extremity weakness, with persistent difficulties with language. Pt had risk factors of uncontrolled HTN, DM, hypercholesterolemia, and medical noncompliance at the time of stroke. New onset seizure 2/21/14, probable focal onset seizure with secondary generalization with left hemisphere onset due to stroke. Has also had a cluster of focal onset seizures without DEMAR/LOC. Having ongoing breakthrough seizures, failed Vimpat, Keppra - mood changes, Fycompa, and Onfi - ineffective and possible imbalance. Exam with elevated /84, mild expressive aphasia, o/w non-focal.    -Increased Lamictal to 100/100mg  -Continued Carbamazepine 300mg tabs, 2 tabs twice a day  -Check carbamazepine and Lamictal levels on 5/9/18  -CMP and CBC with diff on 5/9/18 to monitor for toxicity especially after taking CBD oil  -Pt is instructed to not take CBD oil due to potential toxic interactions with Lamictal and Carbamazepine, liver toxicity, and it does not have known efficacy for her type of epilepsy.  -Continued Effexor XR 75mg daily.   -Continued Plavix 75mg daily. She returns for f/u accompanied by her daughter. Lamictal level on 5/2/18 was subtherapeutic, dose was increased to 150mg bid on 5/18/18. She had two seizures on 6/11/18 and we increased her Lamictal dose, currently taking 150mg in AM and 225mg in PM.  She had one seizure since then, in July. Her other daughter, Heavenly Navarrete, saw her have one while in her daughter's car. She still had control of motor function, but was vocalizing the way she does with her seizures.  Initially states she cannot recall if her arms were shaking or not, but then states that they were both shaking extended in front of her. No EDMAR/LOC. Lasted about 2 minutes. She had an evaluation with ST at [address] [place] [address] in  and has additional [address] appointments. Pt complains that she is doing poorly, but her daughter disagrees. Pt feels she has right sided weakness, though this is not appreciated on her exam.     Past Medical History:   Diagnosis Date    Anxiety     anxiety    Diabetes ([facility]     Hypercholesterolemia     Hypertension     Localization-related symptomatic epilepsy and epileptic syndromes with complex partial seizures, intractable, without status epilepticus ([facility]     Stroke ([facility]     Left MCA CVA 2013      Past Surgical History:   Procedure Laterality Date    HX  SECTION      x 3    HX GYN      2 masses removed from ovaries, noncancerous    HX HERNIA REPAIR      HX HYSTERECTOMY        Social History     Social History    Marital status:      Spouse name: N/A    Number of children: N/A    Years of education: N/A     Occupational History    Retired      Social History Main Topics    Smoking status: Never Smoker    Smokeless tobacco: Never Used    Alcohol use No    Drug use: No    Sexual activity: Not on file     Other Topics Concern    Not on file     Social History Narrative    Lives with a roommate     Family History   Problem Relation Age of Onset    Adopted: Yes          Objective:   ROS:  Per HPI or PMH, o/w neg    Allergies   Allergen Reactions    Aspirin Swelling     Periorbital swelling    Codeine Angioedema    Nsaids (Non-Steroidal Anti-Inflammatory Drug) Nausea and Vomiting    Pcn [Penicillins] Swelling       Meds:  Outpatient Medications Prior to Visit   Medication Sig Dispense Refill    lamoTRIgine (LAMICTAL) 150 mg tablet 1 Tab by mouth in AM and 1.5 tabs in  Tab 1    carBAMazepine ER (CARBATROL ER) 300 mg capsule Take 2 Caps by mouth two (2) times a day.  360 Cap 3    glimepiride (AMARYL) 2 mg tablet TAKE 1 TABLET BY MOUTH EVERY DAY  3    venlafaxine-SR (EFFEXOR-XR) 75 mg capsule TAKE 1 CAPSULE BY MOUTH DAILY. 90 Cap 3    hydrALAZINE (APRESOLINE) 25 mg tablet Take 1 Tab by mouth two (2) times a day. 60 Tab 0    verapamil CR (VERELAN) 360 mg CR capsule Take 360 mg by mouth daily.  atorvastatin (LIPITOR) 40 mg tablet Take 40 mg by mouth nightly.  metFORMIN ER (GLUCOPHAGE XR) 500 mg tablet Take 2,000 mg by mouth daily (with dinner).  valsartan-hydrochlorothiazide (DIOVAN-HCT) 320-25 mg per tablet Take 1 Tab by mouth daily. Indications: HYPERTENSION 30 Tab 0    clopidogrel (PLAVIX) 75 mg tablet Take 1 Tab by mouth daily. 30 Tab 1    LORazepam (ATIVAN) 0.5 mg tablet Take 2 Tabs by mouth every eight (8) hours as needed for Anxiety. Max Daily Amount: 3 mg. 15 Tab 0     No facility-administered medications prior to visit. Imaging:  MRI Results (most recent):    Results from Hospital Encounter encounter on 02/21/14   MRA BRAIN WO CONT   Narrative **Final Report**      ICD Codes / Adm. Diagnosis: 435.9  884395 / Unspecified transient cerebral    Urinary Incontinence  Examination:  MR HEAD MRA WO CON  - [id number] - Feb 21 2014  2:34PM  Accession No:  [id number]  Reason:        REPORT:  INDICATION:  Right facial droop    EXAMINATION:  MRI BRAIN WITH/WITHOUT CONTRAST, MRA HEAD WO CONTRAST    COMPARISON:  CT head earlier today    TECHNIQUE:  MR imaging of the brain was performed with sagittal T1, axial   T1, T2, FLAIR, GRE, DWI/ADC; pre and post contrast multiplanar T1 utilizing   7 mL Gadavist.  3 D time of flight MRA of the head was performed. FINDINGS:    The ventricles are midline without hydrocephalus. There is no acute intra   or extra-axial fluid collection. There is encephalomalacia in the anterior   left middle cerebral artery territory, with associated left sided while area   and degeneration. There is a punctate remote lacunar infarction within the   left cerebellum.  There is no acute infarction. The major intracranial   vascular flow-voids are patent. There is no abnormal parenchymal or   meningeal enhancement. Basilar artery is diminutive due to bilateral posterior communicating   arteries. There is mild irregularity of the bilateral posterior cerebral   arteries. Internal carotid arteries are patent. There is focal irregularity   and narrowing at the left distal M1 segment, with minimal flow in the   anterior division of the left middle cerebral artery ranges. There is also   mild irregularity throughout the bilateral anterior cerebral arteries. There   is no aneurysm. IMPRESSION:  Encephalomalacia anterior left middle cerebral artery territory with   associated left sided Wallerian degeneration. Diminished flow anterior   division left MCA branches, felt to be chronic. No acute infarction. Signing/Reading Doctor: Rodrigo Hayden ([id number])    Approved: Rodrigo Hayden ([id number])  Feb 21 2014  3:35PM                                    CT Results (most recent):    Results from Hospital Encounter encounter on 01/04/15   CT CODE NEURO HEAD WO CONTRAST   Narrative **Final Report**      ICD Codes / Adm. Diagnosis: [phone] / Stroke  STROKE  Examination:  CT CODE NEURO HEAD WO CON  - [id number] - Jan 4 2015  9:08PM  Accession No:  [id number]  Reason:  aphasia, right droop      REPORT:  INDICATION: Stroke. Aphasia. FINDINGS: 5 mm axial images were obtained from the skull base through the   vertex. The ventricles and cortical sulci are appropriate in size and   configuration. There is stable encephalomalacia in the left frontal lobe. There is no evidence of intracranial hemorrhage, mass, mass effect, or acute   infarct. No extra-axial fluid collections are seen. The visualized paranasal   sinuses and mastoid air cells are clear. The orbital structures are   unremarkable. No osseous abnormalities are seen.         IMPRESSION: Stable left frontal lobe encephalomalacia, compatible with old   infarct. No evidence of acute infarct, intracranial hemorrhage, or   intracranial mass. Signing/Reading Doctor: Charo Ayon ([id number])    Approved: Charo Ayon ([id number])  Jan 4 2015  9:19PM                                      Reviewed records in [facility] and media tab today    Lab Review   Results for orders placed or performed in visit on 23/27/99   METABOLIC PANEL, COMPREHENSIVE   Result Value Ref Range    Glucose 237 (H) 65 - 99 mg/dL    BUN 15 6 - 24 mg/dL    Creatinine 0.66 0.57 - 1.00 mg/dL    GFR est non-AA 97 >59 mL/min/1.73    GFR est  >59 mL/min/1.73    BUN/Creatinine ratio 23 9 - 23    Sodium 140 134 - 144 mmol/L    Potassium 4.1 3.5 - 5.2 mmol/L    Chloride 95 (L) 96 - 106 mmol/L    CO2 27 18 - 29 mmol/L    Calcium 9.6 8.7 - 10.2 mg/dL    Protein, total 6.6 6.0 - 8.5 g/dL    Albumin 4.4 3.5 - 5.5 g/dL    GLOBULIN, TOTAL 2.2 1.5 - 4.5 g/dL    A-G Ratio 2.0 1.2 - 2.2    Bilirubin, total <0.2 0.0 - 1.2 mg/dL    Alk. phosphatase 91 39 - 117 IU/L    AST (SGOT) 16 0 - 40 IU/L    ALT (SGPT) 21 0 - 32 IU/L   CBC WITH AUTOMATED DIFF   Result Value Ref Range    WBC 8.3 3.4 - 10.8 x10E3/uL    RBC 4.34 3.77 - 5.28 x10E6/uL    HGB 12.0 11.1 - 15.9 g/dL    HCT 36.9 34.0 - 46.6 %    MCV 85 79 - 97 fL    MCH 27.6 26.6 - 33.0 pg    MCHC 32.5 31.5 - 35.7 g/dL    RDW 14.1 12.3 - 15.4 %    PLATELET 823 (H) 791 - 379 x10E3/uL    NEUTROPHILS 62 Not Estab. %    Lymphocytes 32 Not Estab. %    MONOCYTES 5 Not Estab. %    EOSINOPHILS 1 Not Estab. %    BASOPHILS 0 Not Estab. %    ABS. NEUTROPHILS 5.1 1.4 - 7.0 x10E3/uL    Abs Lymphocytes 2.6 0.7 - 3.1 x10E3/uL    ABS. MONOCYTES 0.4 0.1 - 0.9 x10E3/uL    ABS. EOSINOPHILS 0.1 0.0 - 0.4 x10E3/uL    ABS. BASOPHILS 0.0 0.0 - 0.2 x10E3/uL    IMMATURE GRANULOCYTES 0 Not Estab. %    ABS. IMM.  GRANS. 0.0 0.0 - 0.1 x10E3/uL   LAMOTRIGINE (LAMICTAL)   Result Value Ref Range    Lamotrigine 1.3 (L) 2.0 - 20.0 ug/mL   CARBAMAZEPINE   Result Value Ref Range Carbamazepine 13.3 (HH) 4.0 - 12.0 ug/mL        Exam:  Visit Vitals    /82    Pulse 93    Resp 16    Ht 5' 5\" (1.651 m)    Wt 78 kg (172 lb)    SpO2 96%    BMI 28.62 kg/m2       General:  Alert, cooperative, no distress. Head:  Normocephalic, without obvious abnormality, atraumatic. Respiratory:  Heart:   Non labored breathing  Regular rhythm, tachycardic,  no murmurs       Extremities: Warm, no cyanosis or edema. Pulses: 2+ radial pulses. Neurologic:  MS: Alert and oriented x 4, speech intact. Language - word finding, able to name, repeat and follow all commands. Attention and fund of knowledge appropriate. Recent and remote memory intact. Cranial Nerves:  II: visual fields VFF   II: pupils    II: optic disc    III,VII: ptosis none   III,IV,VI: extraocular muscles  EOMI, no nystagmus or diplopia   V: facial light touch sensation     VII: facial muscle function   symmetric   VIII: hearing intact   IX: soft palate elevation     XI: trapezius strength     XI: sternocleidomastoid strength    XII: tongue       Motor: normal bulk and tone, no tremor              Strength: 5/5 throughout, no PD    Coordination: FTN and MARIA GUADALUPE intact  Gait: normal gait  Reflexes: 2+ symmetric           Assessment/Plan   Pt is a 59 y.o. left handed female with refractroy focal onset epilepsy and h/o left MCA CVA on 7/17/13 with partial aphasia, right facial weakness and right upper extremity weakness, with persistent difficulties with language. Pt had risk factors of uncontrolled HTN, DM, hypercholesterolemia, and medical noncompliance at the time of stroke. New onset seizure 2/21/14, probable focal onset seizure with secondary generalization with left hemisphere onset due to stroke. Has also had a cluster of focal onset seizures without DEMAR/LOC. Having ongoing breakthrough seizures reporting bilateral arm shaking and vocalization without DEMAR/LOC raising concern for CHRISTIAN.  Failed Vimpat, Keppra - mood changes, Fycompa, and Onfi - ineffective and possible imbalance. Exam with elevated /82, mild expressive aphasia, o/w non-focal.    -Continue Lamictal to 150/225mg  -Continue Carbamazepine 300mg tabs, 2 tabs twice a day  -Continue Effexor XR 75mg daily.   -Continue Plavix 75mg daily  -Recommend EMU admission to determine epileptic vs CHRISTIAN, and if epileptic, to help determine if she might be a surgical candidate. -F/u in clinic in 3-4 months, instructed to call in the interim if needed. ICD-10-CM ICD-9-CM    1. Localization-related symptomatic epilepsy and epileptic syndromes with complex partial seizures, intractable, without status epilepticus ([facility] G40.219 345.41 REFERRAL TO NEUROLOGY   2. Aphasia as late effect of cerebrovascular accident I69.320 438.11    3. Cerebral infarction, unspecified mechanism ([facility] I63.9 434.91        Signed:   Yamila Robb MD  7/25/2018

## 2018-07-25 NOTE — MR AVS SNAPSHOT
[address] 
[phone] Patient: Jimi  MRN: [id number] WMU:[id number] Visit Information Date & Time Provider Department Dept. Phone Encounter #  
 7/25/2018 11:40 AM Chris Mckeon MD [facility] at [facility] [phone] Follow-up Instructions Return in about 3 months (around 10/25/2018). Upcoming Health Maintenance Date Due Hepatitis C Screening 1960 FOOT EXAM Q1 9/12/1968 EYE EXAM RETINAL OR DILATED Q1 9/12/1968 Pneumococcal 19-64 Medium Risk (1 of 1 - PPSV23) 9/12/1977 DTaP/Tdap/Td series (1 - Tdap) 9/12/1979 PAP AKA CERVICAL CYTOLOGY 9/12/1979 BREAST CANCER SCRN MAMMOGRAM 9/12/2008 FOBT Q 1 YEAR AGE 50-75 9/12/2008 ZOSTER VACCINE AGE 60> 7/12/2018 HEMOGLOBIN A1C Q6M 7/13/2018 Influenza Age 5 to Adult 8/1/2018 MICROALBUMIN Q1 1/13/2019 LIPID PANEL Q1 1/13/2019 Allergies as of 7/25/2018  Review Complete On: 7/25/2018 By: Amparo Oropeza LPN Severity Noted Reaction Type Reactions Aspirin  02/21/2014   Systemic Swelling Periorbital swelling Codeine  07/14/2013    Angioedema Nsaids (Non-steroidal Anti-inflammatory Drug)  07/14/2013    Nausea and Vomiting Pcn [Penicillins]  07/14/2013    Swelling Current Immunizations  Reviewed on 1/6/2015 No immunizations on file. Not reviewed this visit You Were Diagnosed With   
  
 Codes Comments Localization-related symptomatic epilepsy and epileptic syndromes with complex partial seizures, intractable, without status epilepticus ([facility]    -  Primary ICD-10-CM: X64.797 ICD-9-CM: 345.41 Aphasia as late effect of cerebrovascular accident     ICD-10-CM: I69.320 ICD-9-CM: 438.11 Cerebral infarction, unspecified mechanism ([facility]     ICD-10-CM: I63.9 ICD-9-CM: 434.91 Vitals BP Pulse Resp Height(growth percentile) Weight(growth percentile) SpO2  
 162/82 93 16 5' 5\" (1.651 m) 172 lb (78 kg) 96% BMI OB Status Smoking Status 28.62 kg/m2 Postmenopausal Never Smoker Vitals History BMI and BSA Data Body Mass Index Body Surface Area  
 28.62 kg/m 2 1.89 m 2 Preferred Pharmacy Pharmacy Name Phone [id number] [place], [place] - [address] [facility] AT [address] [place] [address] [phone] Your Updated Medication List  
  
   
This list is accurate as of 7/25/18 12:04 PM.  Always use your most recent med list.  
  
  
  
  
 carBAMazepine  mg capsule Commonly known as:  CARBATROL ER Take 2 Caps by mouth two (2) times a day. clopidogrel 75 mg Tab Commonly known as:  PLAVIX Take 1 Tab by mouth daily. glimepiride 2 mg tablet Commonly known as:  AMARYL  
TAKE 1 TABLET BY MOUTH EVERY DAY  
  
 glucophage  mg tablet Generic drug:  metFORMIN ER Take 2,000 mg by mouth daily (with dinner). hydrALAZINE 25 mg tablet Commonly known as:  APRESOLINE Take 1 Tab by mouth two (2) times a day. lamoTRIgine 150 mg tablet Commonly known as: LaMICtal  
1 Tab by mouth in AM and 1.5 tabs in PM  
  
 LIPITOR 40 mg tablet Generic drug:  atorvastatin Take 40 mg by mouth nightly. LORazepam 0.5 mg tablet Commonly known as:  ATIVAN Take 2 Tabs by mouth every eight (8) hours as needed for Anxiety. Max Daily Amount: 3 mg.  
  
 valsartan-hydroCHLOROthiazide 320-25 mg per tablet Commonly known as:  DIOVAN-HCT Take 1 Tab by mouth daily. Indications: HYPERTENSION  
  
 venlafaxine-SR 75 mg capsule Commonly known as:  EFFEXOR-XR  
TAKE 1 CAPSULE BY MOUTH DAILY. verapamil  mg ER capsule Commonly known as:  Ranell Christian Take 360 mg by mouth daily. Follow-up Instructions Return in about 3 months (around 10/25/2018). Patient Instructions A Healthy Lifestyle: Care Instructions Your Care Instructions A healthy lifestyle can help you feel good, stay at a healthy weight, and have plenty of energy for both work and play. A healthy lifestyle is something you can share with your whole family. A healthy lifestyle also can lower your risk for serious health problems, such as high blood pressure, heart disease, and diabetes. You can follow a few steps listed below to improve your health and the health of your family. Follow-up care is a key part of your treatment and safety. Be sure to make and go to all appointments, and call your doctor if you are having problems. It's also a good idea to know your test results and keep a list of the medicines you take. How can you care for yourself at home? · Do not eat too much sugar, fat, or fast foods. You can still have dessert and treats now and then. The goal is moderation. · Start small to improve your eating habits. Pay attention to portion sizes, drink less juice and soda pop, and eat more fruits and vegetables. ¨ Eat a healthy amount of food. A 3-ounce serving of meat, for example, is about the size of a deck of cards. Fill the rest of your plate with vegetables and whole grains. ¨ Limit the amount of soda and sports drinks you have every day. Drink more water when you are thirsty. ¨ Eat at least 5 servings of fruits and vegetables every day. It may seem like a lot, but it is not hard to reach this goal. A serving or helping is 1 piece of fruit, 1 cup of vegetables, or 2 cups of leafy, raw vegetables. Have an apple or some carrot sticks as an afternoon snack instead of a candy bar. Try to have fruits and/or vegetables at every meal. 
· Make exercise part of your daily routine. You may want to start with simple activities, such as walking, bicycling, or slow swimming. Try to be active 30 to 60 minutes every day.  You do not need to do all 30 to 60 minutes all at once. For example, you can exercise 3 times a day for 10 or 20 minutes. Moderate exercise is safe for most people, but it is always a good idea to talk to your doctor before starting an exercise program. 
· Keep moving. Katie Andrade the lawn, work in the garden, or [employer]. Take the stairs instead of the elevator at work. · If you smoke, quit. People who smoke have an increased risk for heart attack, stroke, cancer, and other lung illnesses. Quitting is hard, but there are ways to boost your chance of quitting tobacco for good. ¨ Use nicotine gum, patches, or lozenges. ¨ Ask your doctor about stop-smoking programs and medicines. ¨ Keep trying. In addition to reducing your risk of diseases in the future, you will notice some benefits soon after you stop using tobacco. If you have shortness of breath or asthma symptoms, they will likely get better within a few weeks after you quit. · Limit how much alcohol you drink. Moderate amounts of alcohol (up to 2 drinks a day for men, 1 drink a day for women) are okay. But drinking too much can lead to liver problems, high blood pressure, and other health problems. Family health If you have a family, there are many things you can do together to improve your health. · Eat meals together as a family as often as possible. · Eat healthy foods. This includes fruits, vegetables, lean meats and dairy, and whole grains. · Include your family in your fitness plan. Most people think of activities such as jogging or tennis as the way to fitness, but there are many ways you and your family can be more active. Anything that makes you breathe hard and gets your heart pumping is exercise. Here are some tips: 
¨ Walk to do errands or to take your child to school or the bus. ¨ Go for a family bike ride after dinner instead of watching TV. Where can you learn more? Go to http://hansel-jose carlos.info/. Enter J296 in the search box to learn more about \"A Healthy Lifestyle: Care Instructions. \" Current as of: December 7, 2017 Content Version: 11.7 © 3741-8376 [employer], [employer]. Care instructions adapted under license by [employer] (which disclaims liability or warranty for this information). If you have questions about a medical condition or this instruction, always ask your healthcare professional. [address] any warranty or liability for your use of this information. Introducing [facility]! Patrick Campo introduces [employer] patient portal. Now you can access parts of your medical record, email your doctor's office, and request medication refills online. 1. In your internet browser, go to [email or web address] 2. Click on the First Time User? Click Here link in the Sign In box. You will see the New Member Sign Up page. 3. Enter your [employer] Access Code exactly as it appears below. You will not need to use this code after youve completed the sign-up process. If you do not sign up before the expiration date, you must request a new code. · [employer] Access Code: [email or web address] Expires: 10/23/2018 11:01 AM 
 
4. Enter the last four digits of your Social Security Number (xxxx) and Date of Birth (mm/dd/yyyy) as indicated and click Submit. You will be taken to the next sign-up page. 5. Create a [employer] ID. This will be your [employer] login ID and cannot be changed, so think of one that is secure and easy to remember. 6. Create a [employer] password. You can change your password at any time. 7. Enter your Password Reset Question and Answer. This can be used at a later time if you forget your password. 8. Enter your e-mail address. You will receive e-mail notification when new information is available in [address] 9. Click Sign Up. You can now view and download portions of your medical record. 10. Click the Download Summary menu link to download a portable copy of your medical information. If you have questions, please visit the Frequently Asked Questions section of the [email or web address] website. Remember, [email or web address] is NOT to be used for urgent needs. For medical emergencies, dial 911. Now available from your iPhone and Android! Please provide this summary of care documentation to your next provider. Your primary care clinician is listed as Yi Baer. If you have any questions after today's visit, please call [phone].

## 2018-07-25 NOTE — PATIENT INSTRUCTIONS

## 2018-07-25 NOTE — PROGRESS NOTES
Koby Mendesprema is here to follow up epilepsy and CVA. Her last seizure was in the beginning of July.

## 2019-02-01 ENCOUNTER — TELEPHONE (OUTPATIENT)
Dept: NEUROLOGY | Age: 59
End: 2019-02-01

## 2019-02-01 NOTE — TELEPHONE ENCOUNTER
Pt's caregiver calling about how pt had bad seizure and had some questions about medication that she is.  Please call back

## 2019-02-05 RX ORDER — LAMOTRIGINE 150 MG/1
TABLET ORAL
Qty: 270 TAB | Refills: 0 | Status: SHIPPED | OUTPATIENT
Start: 2019-02-05 | End: 2019-03-14 | Stop reason: SDUPTHER

## 2019-02-05 NOTE — TELEPHONE ENCOUNTER
Patient reports monthly seizures since last visit. She is currently taking Lamotrigine 150mg (1 tab in AM and 1.5 tab in PM) and Carbamepazine 600mg (2 caps bid). Patient denies missing medication doses or any recent illnesses. She has a follow up appointment scheduled in April. Please advise.

## 2019-02-05 NOTE — TELEPHONE ENCOUNTER
I gave reviewed this information with patient and had her read it back to me. She expressed understanding and agrees to this plan.

## 2019-02-05 NOTE — TELEPHONE ENCOUNTER
Michele - I believe she is taking Carbamazepine ER 300mg, 2 caps bid, not 600mg caps. Please have her increase Lamictal to 1.5 tabs bid, so 225mg bid.

## 2019-02-05 NOTE — TELEPHONE ENCOUNTER
----- Message from Karime Cruz sent at 2/5/2019 12:42 PM EST -----  Regarding: DR Fuentes/telephone  Pt (p) [phone]. Patient was returning the nurses call.

## 2019-03-11 ENCOUNTER — TELEPHONE (OUTPATIENT)
Dept: NEUROLOGY | Age: 59
End: 2019-03-11

## 2019-03-11 NOTE — TELEPHONE ENCOUNTER
----- Message from Darrin Ugarte sent at 3/11/2019 12:21 PM EDT -----  Regarding: /[facility] pt's care giver is calling to advise  that the pt had another Seizure on Jose 3/3/19,5 weeks in between since the last one. Best contact number is [phone].

## 2019-03-12 NOTE — TELEPHONE ENCOUNTER
Spoke with Ly Gómez, patient's caregiver, and she reported patient having a seizure this past Sunday. It has been approximately five weeks since her last seizure. The seizure lasted about four minutes. Patient did not suffer an injury and did not become incontinent. She denies recent illness but has had a great deal of stress. She is currently on Carbamazepine ER 300mg, 2 caps BID and Lamotrigine 150mg, 1.5 caps BID and denies missing any doses. Please advise.

## 2019-03-14 RX ORDER — LAMOTRIGINE 150 MG/1
TABLET ORAL
Qty: 315 TAB | Refills: 0 | Status: SHIPPED | OUTPATIENT
Start: 2019-03-14 | End: 2019-06-17 | Stop reason: SDUPTHER

## 2019-03-14 NOTE — TELEPHONE ENCOUNTER
LM on VM at [facility] number to call the clinic ([phone], number as is, is incorrect.)    Michele - Please try to reach Harpal Bangura and correct phone number. Have her increase Lamictal to 225mg in AM and 300mg in PM. She has an appt on 4/5/19, will check a level then.

## 2019-04-05 ENCOUNTER — OFFICE VISIT (OUTPATIENT)
Dept: NEUROLOGY | Age: 59
End: 2019-04-05

## 2019-04-05 VITALS
OXYGEN SATURATION: 98 % | SYSTOLIC BLOOD PRESSURE: 142 MMHG | WEIGHT: 178.4 LBS | HEIGHT: 65 IN | BODY MASS INDEX: 29.72 KG/M2 | DIASTOLIC BLOOD PRESSURE: 90 MMHG | RESPIRATION RATE: 18 BRPM | HEART RATE: 68 BPM

## 2019-04-05 DIAGNOSIS — G40.219 LOCALIZATION-RELATED SYMPTOMATIC EPILEPSY AND EPILEPTIC SYNDROMES WITH COMPLEX PARTIAL SEIZURES, INTRACTABLE, WITHOUT STATUS EPILEPTICUS (HCC): Primary | ICD-10-CM

## 2019-04-05 DIAGNOSIS — I69.320 APHASIA AS LATE EFFECT OF CEREBROVASCULAR ACCIDENT: ICD-10-CM

## 2019-04-05 DIAGNOSIS — F32.A DEPRESSION, UNSPECIFIED DEPRESSION TYPE: ICD-10-CM

## 2019-04-05 DIAGNOSIS — I63.9 CEREBRAL INFARCTION, UNSPECIFIED MECHANISM (HCC): ICD-10-CM

## 2019-04-05 NOTE — PROGRESS NOTES
Neurology Progress Note    HISTORY PROVIDED BY: patient and caregiver, Marce Garber    Chief Complaint:   Chief Complaint   Patient presents with    Seizure      Subjective:   Pt is a 59 y.o. left handed female last seen in clinic on 7/25/18 in f/u for refractory focal onset epilepsy and h/o left MCA CVA on 7/17/13 with partial aphasia, right facial weakness and right upper extremity weakness, with persistent difficulties with language. Pt had risk factors of uncontrolled HTN, DM, hypercholesterolemia, and medical noncompliance at the time of stroke. New onset seizure 2/21/14, probable focal onset seizure with secondary generalization with left hemisphere onset due to stroke. Has also had a cluster of focal onset seizures without DEMAR/LOC. Having ongoing breakthrough seizures reporting bilateral arm shaking and vocalization without DEMAR/LOC raising concern for CHRISTIAN. Failed Vimpat, Keppra - mood changes, Fycompa, and Onfi - ineffective and possible imbalance. Exam with elevated /82, mild expressive aphasia, o/w non-focal.    -Continued Lamictal to 150/225mg  -Continued Carbamazepine 300mg tabs, 2 tabs twice a day  -Continued Effexor XR 75mg daily.   -Continued Plavix 75mg daily  -Recommended EMU admission to determine epileptic vs CHRISTIAN, and if epileptic, to help determine if she might be a surgical candidate. She returns for f/u. She called the clinic 2/1/19 reporting monthly breakthrough seizures, was taking Lamotrigine 150mg tab, 1 tab in AM and 1.5 tab in PM and Carbamepazine ER 300mg caps, 2 caps bid. Patient denied missing medication doses or any recent illnesses. Lamictal was increased to 225mg bid. She called again on 3/11/19 reporting breakthrough seizure, first in five weeks, lasted 4 minutes.  Lamictal was increased to 225/300mg, however, today she reports that the [facility] pharmacist called her told her that that wasn't safe dose and told her to call me and she did not call and did not increase the Lamictal dose. No seizures since 3/11/19 call. The pt feels like they are more prolonged when they occur, but they feel like they are less frequent. She did not come in for the EMU admission as requested. Her caregiver Michaela Taylor reports that the pt has been under stress due to family matters with her oldest daughter and so she feels the pt has not been focusing on her own health. The pt is visibly upset and tearful during the visit. Michaela Taylor also mentions that the [address] [place] [address] , [facility], at [facility] mentioned other therapies offered at [facility] for epilepsy.      Past Medical History:   Diagnosis Date    Anxiety     anxiety    Diabetes ([facility]     Hypercholesterolemia     Hypertension     Localization-related symptomatic epilepsy and epileptic syndromes with complex partial seizures, intractable, without status epilepticus ([facility]     Stroke ([facility]     Left MCA CVA 2013      Past Surgical History:   Procedure Laterality Date    HX  SECTION      x 3    HX GYN      2 masses removed from ovaries, noncancerous    HX HERNIA REPAIR      HX HYSTERECTOMY        Social History     Socioeconomic History    Marital status:      Spouse name: Not on file    Number of children: Not on file    Years of education: Not on file    Highest education level: Not on file   Occupational History    Occupation: Retired   Social Needs    Financial resource strain: Not on file    Food insecurity:     Worry: Not on file     Inability: Not on file   [employer] needs:     Medical: Not on file     Non-medical: Not on file   Tobacco Use    Smoking status: Never Smoker    Smokeless tobacco: Never Used   Substance and Sexual Activity    Alcohol use: No    Drug use: No    Sexual activity: Not on file   Lifestyle    Physical activity:     Days per week: Not on file     Minutes per session: Not on file    Stress: Not on file   Relationships    Social connections:     Talks on phone: Not on file     Gets together: Not on file     Attends Baptism service: Not on file     Active member of club or organization: Not on file     Attends meetings of clubs or organizations: Not on file     Relationship status: Not on file    Intimate partner violence:     Fear of current or ex partner: Not on file     Emotionally abused: Not on file     Physically abused: Not on file     Forced sexual activity: Not on file   Other Topics Concern    Not on file   Social History Narrative    Lives with a roommate     Family History   Adopted: Yes          Objective:   ROS:  Per HPI or PMH, o/w neg    Allergies   Allergen Reactions    Aspirin Swelling     Periorbital swelling    Codeine Angioedema    Nsaids (Non-Steroidal Anti-Inflammatory Drug) Nausea and Vomiting    Pcn [Penicillins] Swelling       Meds:  Outpatient Medications Prior to Visit   Medication Sig Dispense Refill    lamoTRIgine (LAMICTAL) 150 mg tablet TAKE 1 and 1/2 TABLETs in AM and 2 tablets in PM (Patient taking differently: TAKE 1 and 1/2 TABLETs in AM and 2 tablets in PM  Indications: takin 1.5 tab BID) 315 Tab 0    carBAMazepine ER (CARBATROL ER) 300 mg capsule Take 2 Caps by mouth two (2) times a day. 360 Cap 3    glimepiride (AMARYL) 2 mg tablet TAKE 1 TABLET BY MOUTH EVERY DAY  3    venlafaxine-SR (EFFEXOR-XR) 75 mg capsule TAKE 1 CAPSULE BY MOUTH DAILY. 90 Cap 3    hydrALAZINE (APRESOLINE) 25 mg tablet Take 1 Tab by mouth two (2) times a day. 60 Tab 0    verapamil CR (VERELAN) 360 mg CR capsule Take 360 mg by mouth daily.  LORazepam (ATIVAN) 0.5 mg tablet Take 2 Tabs by mouth every eight (8) hours as needed for Anxiety. Max Daily Amount: 3 mg. 15 Tab 0    atorvastatin (LIPITOR) 40 mg tablet Take 40 mg by mouth nightly.  metFORMIN ER (GLUCOPHAGE XR) 500 mg tablet Take 2,000 mg by mouth daily (with dinner).  valsartan-hydrochlorothiazide (DIOVAN-HCT) 320-25 mg per tablet Take 1 Tab by mouth daily.  Indications: HYPERTENSION 30 Tab 0    clopidogrel (PLAVIX) 75 mg tablet Take 1 Tab by mouth daily. 30 Tab 1     No facility-administered medications prior to visit. Imaging:  MRI Results (most recent):  Results from Hospital Encounter encounter on 02/21/14   MRA BRAIN WO CONT    Narrative **Final Report**       ICD Codes / Adm. Diagnosis: 435.9  539649 / Unspecified transient cerebral    Urinary Incontinence  Examination:  MR HEAD MRA WO CON  - [id number] - Feb 21 2014  2:34PM  Accession No:  [id number]  Reason:        REPORT:  INDICATION:  Right facial droop    EXAMINATION:  MRI BRAIN WITH/WITHOUT CONTRAST, MRA HEAD WO CONTRAST    COMPARISON:  CT head earlier today    TECHNIQUE:  MR imaging of the brain was performed with sagittal T1, axial   T1, T2, FLAIR, GRE, DWI/ADC; pre and post contrast multiplanar T1 utilizing   7 mL Gadavist.  3 D time of flight MRA of the head was performed. FINDINGS:    The ventricles are midline without hydrocephalus. There is no acute intra   or extra-axial fluid collection. There is encephalomalacia in the anterior   left middle cerebral artery territory, with associated left sided while area   and degeneration. There is a punctate remote lacunar infarction within the   left cerebellum. There is no acute infarction. The major intracranial   vascular flow-voids are patent. There is no abnormal parenchymal or   meningeal enhancement. Basilar artery is diminutive due to bilateral posterior communicating   arteries. There is mild irregularity of the bilateral posterior cerebral   arteries. Internal carotid arteries are patent. There is focal irregularity   and narrowing at the left distal M1 segment, with minimal flow in the   anterior division of the left middle cerebral artery ranges. There is also   mild irregularity throughout the bilateral anterior cerebral arteries. There   is no aneurysm. IMPRESSION:  Encephalomalacia anterior left middle cerebral artery territory with   associated left sided Wallerian degeneration.  Diminished flow anterior division left MCA branches, felt to be chronic. No acute infarction. Signing/Reading Doctor: Pilo Handy ([id number])    Approved: Pilo Handy ([id number])  Feb 21 2014  3:35PM                                    CT Results (most recent):  Results from Hospital Encounter encounter on 01/04/15   CT CODE NEURO HEAD WO CONTRAST    Narrative **Final Report**       ICD Codes / Adm. Diagnosis: [phone] / Stroke  STROKE  Examination:  CT CODE NEURO HEAD WO CON  - [id number] - Jan 4 2015  9:08PM  Accession No:  [id number]  Reason:  aphasia, right droop      REPORT:  INDICATION: Stroke. Aphasia. FINDINGS: 5 mm axial images were obtained from the skull base through the   vertex. The ventricles and cortical sulci are appropriate in size and   configuration. There is stable encephalomalacia in the left frontal lobe. There is no evidence of intracranial hemorrhage, mass, mass effect, or acute   infarct. No extra-axial fluid collections are seen. The visualized paranasal   sinuses and mastoid air cells are clear. The orbital structures are   unremarkable. No osseous abnormalities are seen. IMPRESSION: Stable left frontal lobe encephalomalacia, compatible with old   infarct. No evidence of acute infarct, intracranial hemorrhage, or   intracranial mass.               Signing/Reading Doctor: Alexis Hall ([id number])    Approved: Alexis Hall ([id number])  Jan 4 2015  9:19PM                                      Reviewed records in [employer] tab today    Lab Review   Results for orders placed or performed in visit on 79/86/20   METABOLIC PANEL, COMPREHENSIVE   Result Value Ref Range    Glucose 237 (H) 65 - 99 mg/dL    BUN 15 6 - 24 mg/dL    Creatinine 0.66 0.57 - 1.00 mg/dL    GFR est non-AA 97 >59 mL/min/1.73    GFR est  >59 mL/min/1.73    BUN/Creatinine ratio 23 9 - 23    Sodium 140 134 - 144 mmol/L    Potassium 4.1 3.5 - 5.2 mmol/L    Chloride 95 (L) 96 - 106 mmol/L    CO2 27 18 - 29 mmol/L Calcium 9.6 8.7 - 10.2 mg/dL    Protein, total 6.6 6.0 - 8.5 g/dL    Albumin 4.4 3.5 - 5.5 g/dL    GLOBULIN, TOTAL 2.2 1.5 - 4.5 g/dL    A-G Ratio 2.0 1.2 - 2.2    Bilirubin, total <0.2 0.0 - 1.2 mg/dL    Alk. phosphatase 91 39 - 117 IU/L    AST (SGOT) 16 0 - 40 IU/L    ALT (SGPT) 21 0 - 32 IU/L   CBC WITH AUTOMATED DIFF   Result Value Ref Range    WBC 8.3 3.4 - 10.8 x10E3/uL    RBC 4.34 3.77 - 5.28 x10E6/uL    HGB 12.0 11.1 - 15.9 g/dL    HCT 36.9 34.0 - 46.6 %    MCV 85 79 - 97 fL    MCH 27.6 26.6 - 33.0 pg    MCHC 32.5 31.5 - 35.7 g/dL    RDW 14.1 12.3 - 15.4 %    PLATELET 398 (H) 787 - 379 x10E3/uL    NEUTROPHILS 62 Not Estab. %    Lymphocytes 32 Not Estab. %    MONOCYTES 5 Not Estab. %    EOSINOPHILS 1 Not Estab. %    BASOPHILS 0 Not Estab. %    ABS. NEUTROPHILS 5.1 1.4 - 7.0 x10E3/uL    Abs Lymphocytes 2.6 0.7 - 3.1 x10E3/uL    ABS. MONOCYTES 0.4 0.1 - 0.9 x10E3/uL    ABS. EOSINOPHILS 0.1 0.0 - 0.4 x10E3/uL    ABS. BASOPHILS 0.0 0.0 - 0.2 x10E3/uL    IMMATURE GRANULOCYTES 0 Not Estab. %    ABS. IMM. GRANS. 0.0 0.0 - 0.1 x10E3/uL   LAMOTRIGINE (LAMICTAL)   Result Value Ref Range    Lamotrigine 1.3 (L) 2.0 - 20.0 ug/mL   CARBAMAZEPINE   Result Value Ref Range    Carbamazepine 13.3 (HH) 4.0 - 12.0 ug/mL        Exam:  Visit Vitals  /90   Pulse 68   Resp 18   Ht 5' 5\" (1.651 m)   Wt 80.9 kg (178 lb 6.4 oz)   SpO2 98%   BMI 29.69 kg/m²       General:  Alert, cooperative, no distress. Head:  Normocephalic, without obvious abnormality, atraumatic. Respiratory:  Heart:   Non labored breathing  Regular rhythm, tachycardic,  no murmurs       Extremities: Warm, no cyanosis or edema. Pulses: 2+ radial pulses. Neurologic:  MS: Alert, tearful, and oriented x 4, speech intact. Language - word finding, able to name, repeat and follow all commands. Attention and fund of knowledge appropriate. Recent and remote memory intact.     Cranial Nerves:  II: visual fields VFF   II: pupils    II: optic disc III,VII: ptosis none   III,IV,VI: extraocular muscles  EOMI, no nystagmus or diplopia   V: facial light touch sensation     VII: facial muscle function   symmetric   VIII: hearing intact   IX: soft palate elevation     XI: trapezius strength     XI: sternocleidomastoid strength    XII: tongue       Motor: normal bulk and tone, no tremor              Strength: 5/5 throughout, no PD    Coordination: FTN and MARIA GUADALUPE intact  Gait: normal gait  Reflexes: 2+ symmetric           Assessment/Plan   Pt is a 59 y.o. left handed female with refractory focal onset epilepsy and h/o left MCA CVA on 7/17/13 with partial aphasia, right facial weakness and right upper extremity weakness, with persistent difficulties with language. Pt had risk factors of uncontrolled HTN, DM, hypercholesterolemia, and medical noncompliance at the time of stroke. New onset seizure 2/21/14, probable focal onset seizure with secondary generalization with left hemisphere onset due to stroke. Has also had a cluster of focal onset seizures without DEMAR/LOC. Additionally, has reported spells of bilateral arm shaking and vocalization without DEMAR/LOC raising concern for CHRISTIAN. Failed Vimpat, Keppra - mood changes, Fycompa, and Onfi - ineffective and possible imbalance. Exam with elevated /82, mild expressive aphasia, o/w non-focal.    -Increase Lamictal to 225/300mg  -Lamictal level today  -Continue Carbamazepine 300mg tabs, 2 tabs twice a day  -Continue Effexor XR 75mg daily.   -Continue Plavix 75mg daily  -Recommend EMU admission to determine epileptic vs CHRISTIAN, and if epileptic, to help determine if she might be a surgical candidate, but pt is not interested in pursuing this at the current time. -F/u in clinic in 3 months, instructed to call in the interim if needed. ICD-10-CM ICD-9-CM    1.  Localization-related symptomatic epilepsy and epileptic syndromes with complex partial seizures, intractable, without status epilepticus ([facility] G40.219 345.41 LAMOTRIGINE (LAMICTAL)   2. Aphasia as late effect of cerebrovascular accident I69.320 438.11    3. Cerebral infarction, unspecified mechanism ([facility] I63.9 434.91    4. Depression, unspecified depression type F32.9 311        Signed:   Carrie Serrano MD  4/5/2019

## 2019-04-05 NOTE — LETTER
4/28/19 Patient: Aure Lewis YOB: 1960 Date of Visit: 4/5/2019 Agustín Colón MD 
[address] [id number] VIA Facsimile: [phone] Dear Agustín Colón MD, Thank you for referring Ms. Aure Lewis to [address] [place] [address] for evaluation. My notes for this consultation are attached. If you have questions, please do not hesitate to call me. I look forward to following your patient along with you. Sincerely, Kelley Smith MD

## 2019-04-05 NOTE — PROGRESS NOTES
Ms. Lori Lerma presents today to follow up seizures. She has had a seizure monthly since last visit.

## 2019-04-05 NOTE — PATIENT INSTRUCTIONS
A Healthy Lifestyle: Care Instructions  Your Care Instructions    A healthy lifestyle can help you feel good, stay at a healthy weight, and have plenty of energy for both work and play. A healthy lifestyle is something you can share with your whole family. A healthy lifestyle also can lower your risk for serious health problems, such as high blood pressure, heart disease, and diabetes. You can follow a few steps listed below to improve your health and the health of your family. Follow-up care is a key part of your treatment and safety. Be sure to make and go to all appointments, and call your doctor if you are having problems. It's also a good idea to know your test results and keep a list of the medicines you take. How can you care for yourself at home? · Do not eat too much sugar, fat, or fast foods. You can still have dessert and treats now and then. The goal is moderation. · Start small to improve your eating habits. Pay attention to portion sizes, drink less juice and soda pop, and eat more fruits and vegetables. ? Eat a healthy amount of food. A 3-ounce serving of meat, for example, is about the size of a deck of cards. Fill the rest of your plate with vegetables and whole grains. ? Limit the amount of soda and sports drinks you have every day. Drink more water when you are thirsty. ? Eat at least 5 servings of fruits and vegetables every day. It may seem like a lot, but it is not hard to reach this goal. A serving or helping is 1 piece of fruit, 1 cup of vegetables, or 2 cups of leafy, raw vegetables. Have an apple or some carrot sticks as an afternoon snack instead of a candy bar. Try to have fruits and/or vegetables at every meal.  · Make exercise part of your daily routine. You may want to start with simple activities, such as walking, bicycling, or slow swimming. Try to be active 30 to 60 minutes every day. You do not need to do all 30 to 60 minutes all at once.  For example, you can exercise 3 times a day for 10 or 20 minutes. Moderate exercise is safe for most people, but it is always a good idea to talk to your doctor before starting an exercise program.  · Keep moving. Scott Salazarvels the lawn, work in the garden, or 6connect. Take the stairs instead of the elevator at work. · If you smoke, quit. People who smoke have an increased risk for heart attack, stroke, cancer, and other lung illnesses. Quitting is hard, but there are ways to boost your chance of quitting tobacco for good. ? Use nicotine gum, patches, or lozenges. ? Ask your doctor about stop-smoking programs and medicines. ? Keep trying. In addition to reducing your risk of diseases in the future, you will notice some benefits soon after you stop using tobacco. If you have shortness of breath or asthma symptoms, they will likely get better within a few weeks after you quit. · Limit how much alcohol you drink. Moderate amounts of alcohol (up to 2 drinks a day for men, 1 drink a day for women) are okay. But drinking too much can lead to liver problems, high blood pressure, and other health problems. Family health  If you have a family, there are many things you can do together to improve your health. · Eat meals together as a family as often as possible. · Eat healthy foods. This includes fruits, vegetables, lean meats and dairy, and whole grains. · Include your family in your fitness plan. Most people think of activities such as jogging or tennis as the way to fitness, but there are many ways you and your family can be more active. Anything that makes you breathe hard and gets your heart pumping is exercise. Here are some tips:  ? Walk to do errands or to take your child to school or the bus.  ? Go for a family bike ride after dinner instead of watching TV. Where can you learn more? Go to http://hansel-jose carlos.info/. Enter J155 in the search box to learn more about \"A Healthy Lifestyle: Care Instructions. \"  Current as of: September 11, 2018  Content Version: 11.9  © 7814-5987 [employer]. Care instructions adapted under license by [employer] (which disclaims liability or warranty for this information). If you have questions about a medical condition or this instruction, always ask your healthcare professional. [address] any warranty or liability for your use of this information.

## 2019-04-26 RX ORDER — VENLAFAXINE HYDROCHLORIDE 75 MG/1
CAPSULE, EXTENDED RELEASE ORAL
Qty: 90 CAP | Refills: 0 | Status: SHIPPED | OUTPATIENT
Start: 2019-04-26 | End: 2019-07-28 | Stop reason: SDUPTHER

## 2019-04-29 LAB — LAMOTRIGINE SERPL-MCNC: 8 UG/ML (ref 2–20)

## 2019-06-18 RX ORDER — LAMOTRIGINE 150 MG/1
TABLET ORAL
Qty: 315 TAB | Refills: 0 | Status: SHIPPED | OUTPATIENT
Start: 2019-06-18 | End: 2019-08-21 | Stop reason: SDUPTHER

## 2019-06-18 NOTE — TELEPHONE ENCOUNTER
Lois Orozco Pt was seen in April and was to f/u in July and she does not have an appt on the schedule. I have no appt now until Oct/Nov.  She needs an appt made.

## 2019-07-28 RX ORDER — VENLAFAXINE HYDROCHLORIDE 75 MG/1
CAPSULE, EXTENDED RELEASE ORAL
Qty: 90 CAP | Refills: 0 | Status: SHIPPED | OUTPATIENT
Start: 2019-07-28 | End: 2019-11-08 | Stop reason: SDUPTHER

## 2019-08-21 ENCOUNTER — OFFICE VISIT (OUTPATIENT)
Dept: NEUROLOGY | Age: 59
End: 2019-08-21

## 2019-08-21 VITALS
OXYGEN SATURATION: 97 % | SYSTOLIC BLOOD PRESSURE: 142 MMHG | HEART RATE: 90 BPM | HEIGHT: 65 IN | DIASTOLIC BLOOD PRESSURE: 80 MMHG | WEIGHT: 180.6 LBS | BODY MASS INDEX: 30.09 KG/M2 | RESPIRATION RATE: 16 BRPM

## 2019-08-21 DIAGNOSIS — G40.219 LOCALIZATION-RELATED SYMPTOMATIC EPILEPSY AND EPILEPTIC SYNDROMES WITH COMPLEX PARTIAL SEIZURES, INTRACTABLE, WITHOUT STATUS EPILEPTICUS (HCC): Primary | ICD-10-CM

## 2019-08-21 RX ORDER — CLOPIDOGREL BISULFATE 75 MG/1
75 TABLET ORAL DAILY
Qty: 30 TAB | Refills: 1 | Status: SHIPPED | OUTPATIENT
Start: 2019-08-21 | End: 2019-11-05 | Stop reason: SDUPTHER

## 2019-08-21 RX ORDER — CARBAMAZEPINE 300 MG/1
CAPSULE, EXTENDED RELEASE ORAL
Qty: 360 CAP | Refills: 3 | Status: SHIPPED | OUTPATIENT
Start: 2019-08-21

## 2019-08-21 RX ORDER — LAMOTRIGINE 150 MG/1
TABLET ORAL
Qty: 315 TAB | Refills: 2 | Status: SHIPPED | OUTPATIENT
Start: 2019-08-21

## 2019-08-21 NOTE — PATIENT INSTRUCTIONS
[facility]   Statement to Patients  April 1, 2014      In an effort to ensure the large volume of patient prescription refills is processed in the most efficient and expeditious manner, we are asking our patients to assist us by calling your Pharmacy for all prescription refills, this will include also your  Mail Order Pharmacy. The pharmacy will contact our office electronically to continue the refill process. Please do not wait until the last minute to call your pharmacy. We need at least 48 hours (2days) to fill prescriptions. We also encourage you to call your pharmacy before going to  your prescription to make sure it is ready. With regard to controlled substance prescription refill requests (narcotic refills) that need to be picked up at our office, we ask your cooperation by providing us with at least 72 hours (3days) notice that you will need a refill. We will not refill narcotic prescription refill requests after 4:00pm on any weekday, Monday through Thursday, or after 2:00pm on Fridays, or on the weekends. We encourage everyone to explore another way of getting your prescription refill request processed using [employer], our patient web portal through our electronic medical record system. [employer] is an efficient and effective way to communicate your medication request directly to the office and  downloadable as an jean claude on your smart phone . [employer] also features a review functionality that allows you to view your medication list as well as leave messages for your physician. Are you ready to get connected? If so please review the attatched instructions or speak to any of our staff to get you set up right away! Thank you so much for your cooperation. Should you have any questions please contact our Practice Administrator.     The Physicians and Staff,  [facility]

## 2019-08-21 NOTE — PROGRESS NOTES
Date:  19     Name:  John Matson  :  1960  MRN:  [id number]     PCP:  Clayton Castro MD    Chief Complaint   Patient presents with    Seizure       HISTORY OF PRESENT ILLNESS:Follow up visit for seizures. She is accompanied by her caregiver. Patient reports last seizure was July 15, 2019. She is being maintained on Lamictal 150 mg (1.5 tabs in the am and 2 tabs in the pm ) and carbamazepine 300 mg ( 2 tabs BID). She is still dealing with a lot of stressors in her home. She is having a hard time coping with her oldest daughter. She feels like her daughter continues to harass her via text message. She does admit that she thinks some of this seizure is triggered by her stress. She still has not gone to the EMU; she is not open to it right now until her stress levels decrease. Recap  She returns for f/u. She called the clinic 19 reporting monthly breakthrough seizures, was taking Lamotrigine 150mg tab, 1 tab in AM and 1.5 tab in PM and Carbamepazine ER 300mg caps, 2 caps bid. Patient denied missing medication doses or any recent illnesses. Lamictal was increased to 225mg bid. She called again on 3/11/19 reporting breakthrough seizure, first in five weeks, lasted 4 minutes. Lamictal was increased to 225/300mg, however, today she reports that the [facility] pharmacist called her told her that that wasn't safe dose and told her to call me and she did not call and did not increase the Lamictal dose. No seizures since 3/11/19 call. The pt feels like they are more prolonged when they occur, but they feel like they are less frequent. She did not come in for the EMU admission as requested. Her caregiver Malini Benites reports that the pt has been under stress due to family matters with her oldest daughter and so she feels the pt has not been focusing on her own health. The pt is visibly upset and tearful during the visit.   Malini Benites also mentions that the [address] [place] [address] , [facility], at [address] [place] [address] mentioned other therapies offered at [facility] for epilepsy. Current Outpatient Medications   Medication Sig    clopidogrel (PLAVIX) 75 mg tab Take 1 Tab by mouth daily.  lamoTRIgine (LAMICTAL) 150 mg tablet TAKE 1 AND 1/2 TABLETS IN THE MORNING AND 2 TABLETS IN THE EVENING    carBAMazepine ER (CARBATROL ER) 300 mg capsule TAKE 2 CAPSULES BY MOUTH TWO (2) TIMES A DAY.  venlafaxine-SR (EFFEXOR-XR) 75 mg capsule TAKE 1 CAPSULE BY MOUTH DAILY.  glimepiride (AMARYL) 2 mg tablet TAKE 1 TABLET BY MOUTH EVERY DAY    hydrALAZINE (APRESOLINE) 25 mg tablet Take 1 Tab by mouth two (2) times a day.  verapamil CR (VERELAN) 360 mg CR capsule Take 360 mg by mouth daily.  LORazepam (ATIVAN) 0.5 mg tablet Take 2 Tabs by mouth every eight (8) hours as needed for Anxiety. Max Daily Amount: 3 mg.  atorvastatin (LIPITOR) 40 mg tablet Take 40 mg by mouth nightly.  metFORMIN ER (GLUCOPHAGE XR) 500 mg tablet Take 2,000 mg by mouth daily (with dinner).  valsartan-hydrochlorothiazide (DIOVAN-HCT) 320-25 mg per tablet Take 1 Tab by mouth daily. Indications: HYPERTENSION     No current facility-administered medications for this visit.       Allergies   Allergen Reactions    Aspirin Swelling     Periorbital swelling    Codeine Angioedema    Nsaids (Non-Steroidal Anti-Inflammatory Drug) Nausea and Vomiting    Pcn [Penicillins] Swelling     Past Medical History:   Diagnosis Date    Anxiety     anxiety    Diabetes ([address]     Hypercholesterolemia     Hypertension     Localization-related symptomatic epilepsy and epileptic syndromes with complex partial seizures, intractable, without status epilepticus ([address]     Stroke ([facility]     Left MCA CVA 2013     Past Surgical History:   Procedure Laterality Date    HX  SECTION      x 3    HX GYN      2 masses removed from ovaries, noncancerous    HX HERNIA REPAIR      HX HYSTERECTOMY       Social History     Socioeconomic History    Marital status:      Spouse name: Not on file    Number of children: Not on file    Years of education: Not on file    Highest education level: Not on file   Occupational History    Occupation: Retired   Social Needs    Financial resource strain: Not on file    Food insecurity:     Worry: Not on file     Inability: Not on file   [email or web address] needs:     Medical: Not on file     Non-medical: Not on file   Tobacco Use    Smoking status: Never Smoker    Smokeless tobacco: Never Used   Substance and Sexual Activity    Alcohol use: No    Drug use: No    Sexual activity: Not on file   Lifestyle    Physical activity:     Days per week: Not on file     Minutes per session: Not on file    Stress: Not on file   Relationships    Social connections:     Talks on phone: Not on file     Gets together: Not on file     Attends Taoist service: Not on file     Active member of club or organization: Not on file     Attends meetings of clubs or organizations: Not on file     Relationship status: Not on file    Intimate partner violence:     Fear of current or ex partner: Not on file     Emotionally abused: Not on file     Physically abused: Not on file     Forced sexual activity: Not on file   Other Topics Concern    Not on file   Social History Narrative    Lives with a roommate     Family History   Adopted: Yes       PHYSICAL EXAMINATION:    Visit Vitals  /80   Pulse 90   Resp 16   Ht 5' 5\" (1.651 m)   Wt 81.9 kg (180 lb 9.6 oz)   SpO2 97%   BMI 30.05 kg/m²   General: Well defined, nourished, and groomed individual in no acute distress. Neck: Supple, nontender, no bruits, no pain with resistance to active range of motion. Heart: Regular rate and rhythm, no murmurs, rub, or gallop. Normal S1S2. Lungs: Clear to auscultation bilaterally with equal chest expansion, no cough, no wheeze  Musculoskeletal: Extremities revealed no edema and had full range of motion of joints.    Psych: Good mood and bright affect      NEUROLOGICAL EXAMINATION:   Mental Status: Alert and oriented to person, place, and time       Cranial Nerves:   II, III, IV, VI: Visual acuity grossly intact. Visual fields are normal.   Pupils are equal, round, and reactive to light and accommodation. Extra-ocular movements are full and fluid. Fundoscopic exam was benign, no ptosis or nystagmus. V-XII: Hearing is grossly intact. Facial features are symmetric, with normal sensation and strength. The palate rises symmetrically and the tongue protrudes midline. Sternocleidomastoids 5/5.       Motor Examination: Normal tone, bulk, and strength, 5/5 muscle strength throughout.       Coordination: No resting or intention tremor      Gait and Station: Steady while walking. Normal arm swing. No pronator drift. No muscle wasting or fasiculations noted.       Reflexes: DTRs 2+ throughout. ASSESSMENT AND PLAN    ICD-10-CM ICD-9-CM    1. Localization-related symptomatic epilepsy and epileptic syndromes with complex partial seizures, intractable, without status epilepticus ([facility] G40.219 345.41    A 59-year-old female with epilepsy. She continues to have a breakthrough seizure. Unsure if there are CHRISTIAN due to the increasing amount of stress in her life; However since the increase in Lamictal she has only had 1 breakthrough. She would benefit from the EMU however she refuses to go at this time. We discussed trying not to focus on things that she can not change concerning her family issue. We discussed a coping mechanism to help with stressful situations.     -Continue Lamictal to 225mg/300mg  -Continue Carbamazepine 300mg tabs, 2 tabs twice a day  -Continue Effexor XR 75mg daily.   -Continue Plavix 75mg daily  -Recommended EMU admission to determine epileptic vs CHRISTIAN, she refused again. This note will not be viewable in [address]   Nicho Hercules NP

## 2019-11-05 RX ORDER — CLOPIDOGREL BISULFATE 75 MG/1
TABLET ORAL
Qty: 30 TAB | Refills: 1 | Status: SHIPPED | OUTPATIENT
Start: 2019-11-05 | End: 2019-12-23

## 2019-11-08 RX ORDER — VENLAFAXINE HYDROCHLORIDE 75 MG/1
CAPSULE, EXTENDED RELEASE ORAL
Qty: 90 CAP | Refills: 0 | Status: SHIPPED | OUTPATIENT
Start: 2019-11-08 | End: 2020-02-04

## 2019-12-02 ENCOUNTER — HOSPITAL ENCOUNTER (EMERGENCY)
Age: 59
Discharge: HOME | End: 2019-12-02
Attending: EMERGENCY MEDICINE
Payer: MEDICARE

## 2019-12-02 VITALS
SYSTOLIC BLOOD PRESSURE: 128 MMHG | BODY MASS INDEX: 29.32 KG/M2 | WEIGHT: 176 LBS | OXYGEN SATURATION: 96 % | TEMPERATURE: 98.2 F | HEIGHT: 65 IN | RESPIRATION RATE: 17 BRPM | DIASTOLIC BLOOD PRESSURE: 69 MMHG | HEART RATE: 90 BPM

## 2019-12-02 DIAGNOSIS — F41.1 ANXIETY STATE: ICD-10-CM

## 2019-12-02 DIAGNOSIS — R03.0 ELEVATED BLOOD PRESSURE READING: Primary | ICD-10-CM

## 2019-12-02 LAB
ALBUMIN SERPL-MCNC: 3.8 G/DL (ref 3.5–5)
ALBUMIN/GLOB SERPL: 1 {RATIO} (ref 1.1–2.2)
ALP SERPL-CCNC: 91 U/L (ref 45–117)
ALT SERPL-CCNC: 24 U/L (ref 12–78)
ANION GAP SERPL CALC-SCNC: 7 MMOL/L (ref 5–15)
AST SERPL-CCNC: 14 U/L (ref 15–37)
BASOPHILS # BLD: 0 K/UL (ref 0–0.1)
BASOPHILS NFR BLD: 0 % (ref 0–1)
BILIRUB SERPL-MCNC: 0.3 MG/DL (ref 0.2–1)
BUN SERPL-MCNC: 14 MG/DL (ref 6–20)
BUN/CREAT SERPL: 17 (ref 12–20)
CALCIUM SERPL-MCNC: 9.3 MG/DL (ref 8.5–10.1)
CHLORIDE SERPL-SCNC: 99 MMOL/L (ref 97–108)
CO2 SERPL-SCNC: 28 MMOL/L (ref 21–32)
COMMENT, HOLDF: NORMAL
CREAT SERPL-MCNC: 0.81 MG/DL (ref 0.55–1.02)
DIFFERENTIAL METHOD BLD: ABNORMAL
EOSINOPHIL # BLD: 0.1 K/UL (ref 0–0.4)
EOSINOPHIL NFR BLD: 1 % (ref 0–7)
ERYTHROCYTE [DISTWIDTH] IN BLOOD BY AUTOMATED COUNT: 13.7 % (ref 11.5–14.5)
GLOBULIN SER CALC-MCNC: 3.7 G/DL (ref 2–4)
GLUCOSE SERPL-MCNC: 210 MG/DL (ref 65–100)
HCT VFR BLD AUTO: 37.5 % (ref 35–47)
HGB BLD-MCNC: 11.8 G/DL (ref 11.5–16)
IMM GRANULOCYTES # BLD AUTO: 0 K/UL (ref 0–0.04)
IMM GRANULOCYTES NFR BLD AUTO: 0 % (ref 0–0.5)
LYMPHOCYTES # BLD: 1.8 K/UL (ref 0.8–3.5)
LYMPHOCYTES NFR BLD: 24 % (ref 12–49)
MAGNESIUM SERPL-MCNC: 2.2 MG/DL (ref 1.6–2.4)
MCH RBC QN AUTO: 26.6 PG (ref 26–34)
MCHC RBC AUTO-ENTMCNC: 31.5 G/DL (ref 30–36.5)
MCV RBC AUTO: 84.5 FL (ref 80–99)
MONOCYTES # BLD: 0.5 K/UL (ref 0–1)
MONOCYTES NFR BLD: 7 % (ref 5–13)
NEUTS SEG # BLD: 5.3 K/UL (ref 1.8–8)
NEUTS SEG NFR BLD: 68 % (ref 32–75)
NRBC # BLD: 0 K/UL (ref 0–0.01)
NRBC BLD-RTO: 0 PER 100 WBC
PLATELET # BLD AUTO: 330 K/UL (ref 150–400)
PMV BLD AUTO: 8.7 FL (ref 8.9–12.9)
POTASSIUM SERPL-SCNC: 3.9 MMOL/L (ref 3.5–5.1)
PROT SERPL-MCNC: 7.5 G/DL (ref 6.4–8.2)
RBC # BLD AUTO: 4.44 M/UL (ref 3.8–5.2)
SAMPLES BEING HELD,HOLD: NORMAL
SODIUM SERPL-SCNC: 134 MMOL/L (ref 136–145)
WBC # BLD AUTO: 7.8 K/UL (ref 3.6–11)

## 2019-12-02 PROCEDURE — 36415 COLL VENOUS BLD VENIPUNCTURE: CPT

## 2019-12-02 PROCEDURE — 74011250637 HC RX REV CODE- 250/637: Performed by: EMERGENCY MEDICINE

## 2019-12-02 PROCEDURE — 85025 COMPLETE CBC W/AUTO DIFF WBC: CPT

## 2019-12-02 PROCEDURE — 80053 COMPREHEN METABOLIC PANEL: CPT

## 2019-12-02 PROCEDURE — 83735 ASSAY OF MAGNESIUM: CPT

## 2019-12-02 PROCEDURE — 99283 EMERGENCY DEPT VISIT LOW MDM: CPT

## 2019-12-02 RX ORDER — HYDRALAZINE HYDROCHLORIDE 25 MG/1
25 TABLET, FILM COATED ORAL 3 TIMES DAILY
Qty: 60 TAB | Refills: 0 | Status: SHIPPED | OUTPATIENT
Start: 2019-12-02 | End: 2019-12-22

## 2019-12-02 RX ORDER — LORAZEPAM 1 MG/1
1 TABLET ORAL
Status: COMPLETED | OUTPATIENT
Start: 2019-12-02 | End: 2019-12-02

## 2019-12-02 RX ORDER — HYDRALAZINE HYDROCHLORIDE 50 MG/1
50 TABLET, FILM COATED ORAL
Status: COMPLETED | OUTPATIENT
Start: 2019-12-02 | End: 2019-12-02

## 2019-12-02 RX ADMIN — LORAZEPAM 1 MG: 1 TABLET ORAL at 10:07

## 2019-12-02 RX ADMIN — HYDRALAZINE HYDROCHLORIDE 50 MG: 50 TABLET, FILM COATED ORAL at 10:07

## 2019-12-02 NOTE — DISCHARGE INSTRUCTIONS
Patient Education        Elevated Blood Pressure: Care Instructions  Your Care Instructions    Blood pressure is a measure of how hard the blood pushes against the walls of your arteries. It's normal for blood pressure to go up and down throughout the day. But if it stays up over time, you have high blood pressure. Two numbers tell you your blood pressure. The first number is the systolic pressure. It shows how hard the blood pushes when your heart is pumping. The second number is the diastolic pressure. It shows how hard the blood pushes between heartbeats, when your heart is relaxed and filling with blood. An ideal blood pressure in adults is less than 120/80 (say \"120 over 80\"). High blood pressure is 140/90 or higher. You have high blood pressure if your top number is 140 or higher or your bottom number is 90 or higher, or both. The main test for high blood pressure is simple, fast, and painless. To diagnose high blood pressure, your doctor will test your blood pressure at different times. After testing your blood pressure, your doctor may ask you to test it again when you are home. If you are diagnosed with high blood pressure, you can work with your doctor to make a long-term plan to manage it. Follow-up care is a key part of your treatment and safety. Be sure to make and go to all appointments, and call your doctor if you are having problems. It's also a good idea to know your test results and keep a list of the medicines you take. How can you care for yourself at home? · Do not smoke. Smoking increases your risk for heart attack and stroke. If you need help quitting, talk to your doctor about stop-smoking programs and medicines. These can increase your chances of quitting for good. · Stay at a healthy weight. · Try to limit how much sodium you eat to less than 2,300 milligrams (mg) a day. Your doctor may ask you to try to eat less than 1,500 mg a day. · Be physically active.  Get at least 30 minutes of exercise on most days of the week. Walking is a good choice. You also may want to do other activities, such as running, swimming, cycling, or playing tennis or team sports. · Avoid or limit alcohol. Talk to your doctor about whether you can drink any alcohol. · Eat plenty of fruits, vegetables, and low-fat dairy products. Eat less saturated and total fats. · Learn how to check your blood pressure at home. When should you call for help? Call your doctor now or seek immediate medical care if:  ? · Your blood pressure is much higher than normal (such as 180/110 or higher). ? · You think high blood pressure is causing symptoms such as:  ¨ Severe headache. ¨ Blurry vision. ? Watch closely for changes in your health, and be sure to contact your doctor if:  ? · You do not get better as expected. Where can you learn more? Go to http://hansel-jose carlos.info/. Enter T481 in the search box to learn more about \"Elevated Blood Pressure: Care Instructions. \"  Current as of: September 21, 2016  Content Version: 11.4  © 8129-4534 [employer]. Care instructions adapted under license by [employer] (which disclaims liability or warranty for this information). If you have questions about a medical condition or this instruction, always ask your healthcare professional. [address] any warranty or liability for your use of this information.

## 2019-12-02 NOTE — ED TRIAGE NOTES
Triage Note: Patient is coming in for dizziness and high blood pressure for 3 days. Patient is extremely anxious in triage.

## 2019-12-02 NOTE — ED PROVIDER NOTES
59F presents with elevated BP. Pt reports feeling dizzy for past few days. No focal weakness or numbness. No changes in vision. Checked blood pressure today and it was 179/82. No CP, SOB. No fevers. No recent medication changes.              Past Medical History:   Diagnosis Date    Anxiety     anxiety    Diabetes ([facility]     Hypercholesterolemia     Hypertension     Localization-related symptomatic epilepsy and epileptic syndromes with complex partial seizures, intractable, without status epilepticus ([facility]     [facility] ([facility]     Left MCA CVA 2013       Past Surgical History:   Procedure Laterality Date    HX  SECTION      x 3    HX GYN      2 masses removed from ovaries, noncancerous    HX HERNIA REPAIR      HX HYSTERECTOMY           Family History:   Adopted: Yes       Social History     Socioeconomic History    Marital status:      Spouse name: Not on file    Number of children: Not on file    Years of education: Not on file    Highest education level: Not on file   Occupational History    Occupation: Retired   Social Needs    Financial resource strain: Not on file    Food insecurity:     Worry: Not on file     Inability: Not on file   [employer] needs:     Medical: Not on file     Non-medical: Not on file   Tobacco Use    Smoking status: Never Smoker    Smokeless tobacco: Never Used   Substance and Sexual Activity    Alcohol use: No    Drug use: No    Sexual activity: Not on file   Lifestyle    Physical activity:     Days per week: Not on file     Minutes per session: Not on file    Stress: Not on file   Relationships    Social connections:     Talks on phone: Not on file     Gets together: Not on file     Attends Mormonism service: Not on file     Active member of club or organization: Not on file     Attends meetings of clubs or organizations: Not on file     Relationship status: Not on file    Intimate partner violence:     Fear of current or ex partner: Not on file     Emotionally abused: Not on file     Physically abused: Not on file     Forced sexual activity: Not on file   Other Topics Concern    Not on file   Social History Narrative    Lives with a roommate         ALLERGIES: Aspirin; Codeine; Nsaids (non-steroidal anti-inflammatory drug); and Pcn [penicillins]    Review of Systems   Constitutional: Negative for fever. HENT: Negative for facial swelling. Eyes: Negative for visual disturbance. Respiratory: Negative for chest tightness. Cardiovascular: Negative for chest pain. Gastrointestinal: Negative for abdominal pain. Genitourinary: Negative for difficulty urinating and dysuria. Musculoskeletal: Negative for arthralgias. Skin: Negative for rash. Neurological: Negative for headaches. Hematological: Negative for adenopathy. Psychiatric/Behavioral: Negative for suicidal ideas. There were no vitals filed for this visit. Physical Exam  Vitals signs and nursing note reviewed. Constitutional:       General: She is not in acute distress. Appearance: She is well-developed. She is not diaphoretic. HENT:      Head: Normocephalic and atraumatic. Eyes:      General: No scleral icterus. Pupils: Pupils are equal, round, and reactive to light. Neck:      Musculoskeletal: Normal range of motion and neck supple. Thyroid: No thyromegaly. Cardiovascular:      Rate and Rhythm: Normal rate and regular rhythm. Heart sounds: Normal heart sounds. No murmur. Pulmonary:      Effort: Pulmonary effort is normal. No respiratory distress. Breath sounds: Normal breath sounds. Abdominal:      General: Bowel sounds are normal. There is no distension. Palpations: Abdomen is soft. Tenderness: There is no tenderness. Musculoskeletal: Normal range of motion. Skin:     General: Skin is warm and dry. Findings: No rash. Neurological:      Mental Status: She is alert and oriented to person, place, and time. MDM  Number of Diagnoses or Management Options  Anxiety state:   Elevated blood pressure reading:   Diagnosis management comments: A: Patient here for elevated blood pressure over the past couple days at home. Labs and EKG are unremarkable. Repeat blood pressure in the ED was much improved. Stable to send patient home. Will increase the frequency of hydralazine from twice a day to 3 times a day. Patient advised to follow-up with her PCP for repeat blood pressure check and further medication adjustment as needed.          Procedures

## 2019-12-23 RX ORDER — CLOPIDOGREL BISULFATE 75 MG/1
TABLET ORAL
Qty: 30 TAB | Refills: 1 | Status: SHIPPED | OUTPATIENT
Start: 2019-12-23 | End: 2020-02-29

## 2020-02-04 RX ORDER — VENLAFAXINE HYDROCHLORIDE 75 MG/1
CAPSULE, EXTENDED RELEASE ORAL
Qty: 90 CAP | Refills: 0 | Status: SHIPPED | OUTPATIENT
Start: 2020-02-04

## 2020-02-04 NOTE — TELEPHONE ENCOUNTER
Mariah Orozco Pt has not been seen since August.  Needs a f/u appt with NP in next 2 months for future refills.

## 2020-02-04 NOTE — TELEPHONE ENCOUNTER
I attempted to schedule follow up appointment with patient and she stated I woke her up and she agreed to call back later to schedule a follow up appointment.

## 2020-02-29 RX ORDER — CLOPIDOGREL BISULFATE 75 MG/1
TABLET ORAL
Qty: 30 TAB | Refills: 1 | Status: SHIPPED | OUTPATIENT
Start: 2020-02-29 | End: 2020-03-30

## 2020-06-22 RX ORDER — CLOPIDOGREL BISULFATE 75 MG/1
TABLET ORAL
Qty: 90 TAB | Refills: 1 | Status: SHIPPED | OUTPATIENT
Start: 2020-06-22 | End: 2020-12-18

## 2020-12-18 RX ORDER — CLOPIDOGREL BISULFATE 75 MG/1
TABLET ORAL
Qty: 90 TAB | Refills: 1 | Status: SHIPPED | OUTPATIENT
Start: 2020-12-18 | End: 2021-06-24

## 2021-06-24 RX ORDER — CLOPIDOGREL BISULFATE 75 MG/1
TABLET ORAL
Qty: 90 TABLET | Refills: 1 | Status: SHIPPED | OUTPATIENT
Start: 2021-06-24

## 2021-07-21 ENCOUNTER — TRANSCRIBE ORDER (OUTPATIENT)
Dept: SCHEDULING | Age: 61
End: 2021-07-21

## 2021-07-21 DIAGNOSIS — Z12.31 SCREENING MAMMOGRAM FOR HIGH-RISK PATIENT: Primary | ICD-10-CM

## 2021-10-13 ENCOUNTER — HOSPITAL ENCOUNTER (OUTPATIENT)
Dept: MAMMOGRAPHY | Age: 61
Discharge: HOME | End: 2021-10-13
Attending: FAMILY MEDICINE
Payer: MEDICARE

## 2021-10-13 DIAGNOSIS — Z12.31 SCREENING MAMMOGRAM FOR HIGH-RISK PATIENT: ICD-10-CM

## 2021-10-13 PROCEDURE — 77067 SCR MAMMO BI INCL CAD: CPT

## 2021-10-27 ENCOUNTER — HOSPITAL ENCOUNTER (EMERGENCY)
Age: 61
Discharge: HOME | End: 2021-10-27
Attending: EMERGENCY MEDICINE
Payer: MEDICARE

## 2021-10-27 ENCOUNTER — APPOINTMENT (OUTPATIENT)
Dept: CT IMAGING | Age: 61
End: 2021-10-27
Attending: STUDENT IN AN ORGANIZED HEALTH CARE EDUCATION/TRAINING PROGRAM
Payer: MEDICARE

## 2021-10-27 ENCOUNTER — APPOINTMENT (OUTPATIENT)
Dept: CT IMAGING | Age: 61
End: 2021-10-27
Attending: EMERGENCY MEDICINE
Payer: MEDICARE

## 2021-10-27 VITALS
SYSTOLIC BLOOD PRESSURE: 142 MMHG | HEART RATE: 84 BPM | RESPIRATION RATE: 16 BRPM | TEMPERATURE: 98.1 F | DIASTOLIC BLOOD PRESSURE: 71 MMHG | OXYGEN SATURATION: 99 %

## 2021-10-27 DIAGNOSIS — H53.8 BLURRED VISION, BILATERAL: Primary | ICD-10-CM

## 2021-10-27 LAB
ALBUMIN SERPL-MCNC: 3.8 G/DL (ref 3.5–5)
ALBUMIN/GLOB SERPL: 1 {RATIO} (ref 1.1–2.2)
ALP SERPL-CCNC: 90 U/L (ref 45–117)
ALT SERPL-CCNC: 29 U/L (ref 12–78)
ANION GAP SERPL CALC-SCNC: 9 MMOL/L (ref 5–15)
AST SERPL-CCNC: 14 U/L (ref 15–37)
BASOPHILS # BLD: 0.1 K/UL (ref 0–0.1)
BASOPHILS NFR BLD: 1 % (ref 0–1)
BILIRUB SERPL-MCNC: 0.3 MG/DL (ref 0.2–1)
BUN SERPL-MCNC: 14 MG/DL (ref 6–20)
BUN/CREAT SERPL: 18 (ref 12–20)
CALCIUM SERPL-MCNC: 10.1 MG/DL (ref 8.5–10.1)
CHLORIDE SERPL-SCNC: 89 MMOL/L (ref 97–108)
CO2 SERPL-SCNC: 27 MMOL/L (ref 21–32)
COMMENT, HOLDF: NORMAL
CREAT SERPL-MCNC: 0.8 MG/DL (ref 0.55–1.02)
DIFFERENTIAL METHOD BLD: ABNORMAL
EOSINOPHIL # BLD: 0.1 K/UL (ref 0–0.4)
EOSINOPHIL NFR BLD: 1 % (ref 0–7)
ERYTHROCYTE [DISTWIDTH] IN BLOOD BY AUTOMATED COUNT: 14.3 % (ref 11.5–14.5)
GLOBULIN SER CALC-MCNC: 3.7 G/DL (ref 2–4)
GLUCOSE SERPL-MCNC: 141 MG/DL (ref 65–100)
HCT VFR BLD AUTO: 33.5 % (ref 35–47)
HGB BLD-MCNC: 10.7 G/DL (ref 11.5–16)
IMM GRANULOCYTES # BLD AUTO: 0 K/UL (ref 0–0.04)
IMM GRANULOCYTES NFR BLD AUTO: 0 % (ref 0–0.5)
LYMPHOCYTES # BLD: 1.7 K/UL (ref 0.8–3.5)
LYMPHOCYTES NFR BLD: 25 % (ref 12–49)
MCH RBC QN AUTO: 24.7 PG (ref 26–34)
MCHC RBC AUTO-ENTMCNC: 31.9 G/DL (ref 30–36.5)
MCV RBC AUTO: 77.4 FL (ref 80–99)
MONOCYTES # BLD: 0.6 K/UL (ref 0–1)
MONOCYTES NFR BLD: 8 % (ref 5–13)
NEUTS SEG # BLD: 4.5 K/UL (ref 1.8–8)
NEUTS SEG NFR BLD: 65 % (ref 32–75)
NRBC # BLD: 0 K/UL (ref 0–0.01)
NRBC BLD-RTO: 0 PER 100 WBC
PLATELET # BLD AUTO: 464 K/UL (ref 150–400)
PMV BLD AUTO: 8.3 FL (ref 8.9–12.9)
POTASSIUM SERPL-SCNC: 3.7 MMOL/L (ref 3.5–5.1)
PROT SERPL-MCNC: 7.5 G/DL (ref 6.4–8.2)
RBC # BLD AUTO: 4.33 M/UL (ref 3.8–5.2)
SAMPLES BEING HELD,HOLD: NORMAL
SODIUM SERPL-SCNC: 125 MMOL/L (ref 136–145)
WBC # BLD AUTO: 6.9 K/UL (ref 3.6–11)

## 2021-10-27 PROCEDURE — 99283 EMERGENCY DEPT VISIT LOW MDM: CPT

## 2021-10-27 PROCEDURE — 36415 COLL VENOUS BLD VENIPUNCTURE: CPT

## 2021-10-27 PROCEDURE — 70450 CT HEAD/BRAIN W/O DYE: CPT

## 2021-10-27 PROCEDURE — 93005 ELECTROCARDIOGRAM TRACING: CPT

## 2021-10-27 PROCEDURE — 70496 CT ANGIOGRAPHY HEAD: CPT

## 2021-10-27 PROCEDURE — 85025 COMPLETE CBC W/AUTO DIFF WBC: CPT

## 2021-10-27 PROCEDURE — 80053 COMPREHEN METABOLIC PANEL: CPT

## 2021-10-27 PROCEDURE — 74011000636 HC RX REV CODE- 636: Performed by: RADIOLOGY

## 2021-10-27 RX ORDER — FAMOTIDINE 10 MG/1
10 TABLET ORAL DAILY
COMMUNITY

## 2021-10-27 RX ADMIN — IOPAMIDOL 100 ML: 755 INJECTION, SOLUTION INTRAVENOUS at 21:16

## 2021-10-27 NOTE — ED TRIAGE NOTES
She arrives with her son who is her care giver. She has had a stroke in the past and has some aphasia. She has been having intermittent blurred vision for about a week. She finally agreed to come to the ED today to be checked. She also complained of being cold.

## 2021-10-28 LAB
ATRIAL RATE: 89 BPM
CALCULATED P AXIS, ECG09: 59 DEGREES
CALCULATED R AXIS, ECG10: 10 DEGREES
CALCULATED T AXIS, ECG11: 62 DEGREES
DIAGNOSIS, 93000: NORMAL
P-R INTERVAL, ECG05: 176 MS
Q-T INTERVAL, ECG07: 372 MS
QRS DURATION, ECG06: 110 MS
QTC CALCULATION (BEZET), ECG08: 452 MS
VENTRICULAR RATE, ECG03: 89 BPM

## 2021-10-28 NOTE — ED PROVIDER NOTES
Patient is a 20-year-old with a history of diabetes and left MCA stroke in . She comes into the emergency department with blurred peripheral vision that has been intermittent over the course of the last year and a half but has become more persistent over the last 2 weeks. She denies any new numbness, tingling, weakness, dizziness, or other visual changes. The blurred vision is generally improved with rest but has not been improving with rest over the last 2 weeks. She has not had headaches or double vision. She denies chest pain, shortness of breath, fevers, chills, and other associated symptoms. She has an ophthalmology appointment scheduled for tomorrow but comes in today out of concern for possible stroke. Blurred Vision   Associated symptoms include blurred vision. Pertinent negatives include no vomiting, no fever and no dizziness.         Past Medical History:   Diagnosis Date    Anxiety     anxiety    Diabetes ([address]     Hypercholesterolemia     Hypertension     Localization-related symptomatic epilepsy and epileptic syndromes with complex partial seizures, intractable, without status epilepticus ([facility]     Stroke ([facility]     Left MCA CVA 2013       Past Surgical History:   Procedure Laterality Date    HX  SECTION      x 3    HX GYN      2 masses removed from ovaries, noncancerous    HX HERNIA REPAIR      HX HYSTERECTOMY           Family History:   Adopted: Yes       Social History     Socioeconomic History    Marital status:      Spouse name: Not on file    Number of children: Not on file    Years of education: Not on file    Highest education level: Not on file   Occupational History    Occupation: Retired   Tobacco Use    Smoking status: Never Smoker    Smokeless tobacco: Never Used   Substance and Sexual Activity    Alcohol use: No    Drug use: No    Sexual activity: Not on file   Other Topics Concern    Not on file   Social History Narrative    Lives with a roommate     Social Determinants of Health     Financial Resource Strain:     Difficulty of Paying Living Expenses:    Food Insecurity:     Worried About Running Out of Food in the Last Year:     [address] in the Last Year:    Transportation Needs:     Lack of Transportation (Medical):  Lack of Transportation (Non-Medical):    Physical Activity:     Days of Exercise per Week:     Minutes of Exercise per Session:    Stress:     Feeling of Stress :    Social Connections:     Frequency of Communication with Friends and Family:     Frequency of Social Gatherings with Friends and Family:     Attends [employer] Services:     Active Member of Clubs or Organizations:     Attends Club or Organization Meetings:     Marital Status:    Intimate Partner Violence:     Fear of Current or Ex-Partner:     Emotionally Abused:     Physically Abused:     Sexually Abused: ALLERGIES: Aspirin, Codeine, Nsaids (non-steroidal anti-inflammatory drug), and Pcn [penicillins]    Review of Systems   Constitutional: Negative for chills and fever. Eyes: Positive for blurred vision and visual disturbance. Respiratory: Negative for shortness of breath. Cardiovascular: Negative for chest pain. Gastrointestinal: Negative for abdominal pain, constipation, diarrhea and vomiting. Neurological: Negative for dizziness and light-headedness. All other systems reviewed and are negative. Vitals:    10/27/21 1810   BP: (!) 158/74   Pulse: 93   Resp: 16   Temp: 98.1 °F (36.7 °C)   SpO2: 98%            Physical Exam  Vitals and nursing note reviewed. Constitutional:       Appearance: Normal appearance. She is well-developed. HENT:      Head: Normocephalic and atraumatic. Eyes:      Extraocular Movements: Extraocular movements intact. Conjunctiva/sclera: Conjunctivae normal.      Pupils: Pupils are equal, round, and reactive to light.    Cardiovascular:      Rate and Rhythm: Normal rate and regular rhythm. Pulmonary:      Effort: Pulmonary effort is normal.      Breath sounds: Normal breath sounds. Abdominal:      General: There is no distension. Palpations: Abdomen is soft. Tenderness: There is no abdominal tenderness. Musculoskeletal:      Cervical back: Normal range of motion and neck supple. Skin:     General: Skin is warm and dry. Capillary Refill: Capillary refill takes less than 2 seconds. Neurological:      Mental Status: She is alert and oriented to person, place, and time. Cranial Nerves: Cranial nerves are intact. Sensory: Sensation is intact. Motor: Motor function is intact. Comments: Expressive aphasia   Psychiatric:         Mood and Affect: Mood normal.         Behavior: Behavior normal.          MDM       Procedures        The patient is resting comfortably and feels better, is alert, talkative, interactive and in no distress. The repeat examination is unremarkable and benign. The patient is neurologically intact, has a normal mental status and is ambulatory in the ED. The history, exam, diagnostic testing (if any) and the patient's current condition do not suggest  seizure, meningitis, stroke, sepsis, subarachnoid hemorrhage, intracranial bleeding, encephalitis, aneurysm or other significant pathology that would warrant further testing, continued ED treatment, admission, neurological consultation, or other specialist evaluation at this point. The vital signs have been stable. The patient's condition is stable and appropriate for discharge. The patient will pursue further outpatient evaluation with the primary care physician or other designated or consulting physician as indicated in the discharge instructions.

## 2022-03-11 ENCOUNTER — HOSPITAL ENCOUNTER (EMERGENCY)
Age: 62
Discharge: HOME | End: 2022-03-11
Attending: EMERGENCY MEDICINE
Payer: MEDICARE

## 2022-03-11 ENCOUNTER — APPOINTMENT (OUTPATIENT)
Dept: CT IMAGING | Age: 62
End: 2022-03-11
Attending: EMERGENCY MEDICINE
Payer: MEDICARE

## 2022-03-11 VITALS
WEIGHT: 142.42 LBS | HEART RATE: 86 BPM | RESPIRATION RATE: 18 BRPM | BODY MASS INDEX: 23.7 KG/M2 | TEMPERATURE: 98.8 F | SYSTOLIC BLOOD PRESSURE: 148 MMHG | DIASTOLIC BLOOD PRESSURE: 66 MMHG | OXYGEN SATURATION: 97 %

## 2022-03-11 DIAGNOSIS — S09.90XA INJURY OF HEAD, INITIAL ENCOUNTER: Primary | ICD-10-CM

## 2022-03-11 PROCEDURE — 74011250637 HC RX REV CODE- 250/637: Performed by: EMERGENCY MEDICINE

## 2022-03-11 PROCEDURE — 99284 EMERGENCY DEPT VISIT MOD MDM: CPT

## 2022-03-11 PROCEDURE — 70450 CT HEAD/BRAIN W/O DYE: CPT

## 2022-03-11 RX ORDER — ACETAMINOPHEN 500 MG
1000 TABLET ORAL
Status: COMPLETED | OUTPATIENT
Start: 2022-03-11 | End: 2022-03-11

## 2022-03-11 RX ADMIN — ACETAMINOPHEN 1000 MG: 500 TABLET ORAL at 15:57

## 2022-03-11 NOTE — ED PROVIDER NOTES
History of hypertension, diabetes, hyperlipidemia, anxiety, stroke with residual aphasia, seizure disorder. She presents accompanied by her son after sustaining a fall/head injury. They report that she was at [employer] approximately 1 hour ago when she tripped and fell striking her head on the concrete. No loss of consciousness. Her son was by her side and witnessed the fall. She complains of a headache. No nausea or dizziness. No confusion. She is on Plavix. She denies other injuries or complaints. Past Medical History:   Diagnosis Date    Anxiety     anxiety    Diabetes ([facility]     Hypercholesterolemia     Hypertension     Localization-related symptomatic epilepsy and epileptic syndromes with complex partial seizures, intractable, without status epilepticus ([facility]     Stroke ([facility]     Left MCA CVA 2013       Past Surgical History:   Procedure Laterality Date    HX  SECTION      x 3    HX GYN      2 masses removed from ovaries, noncancerous    HX HERNIA REPAIR      HX HYSTERECTOMY           Family History:   Adopted: Yes       Social History     Socioeconomic History    Marital status:      Spouse name: Not on file    Number of children: Not on file    Years of education: Not on file    Highest education level: Not on file   Occupational History    Occupation: Retired   Tobacco Use    Smoking status: Never Smoker    Smokeless tobacco: Never Used   Substance and Sexual Activity    Alcohol use: No    Drug use: No    Sexual activity: Not on file   Other Topics Concern    Not on file   Social History Narrative    Lives with a roommate     Social Determinants of Health     Financial Resource Strain:     Difficulty of Paying Living Expenses: Not on file   Food Insecurity:     Worried About [address] in the Last Year: Not on file    Gianfranco of Food in the Last Year: Not on file   Transportation Needs:     Lack of Transportation (Medical):  Not on file    Lack of Transportation (Non-Medical): Not on file   Physical Activity:     Days of Exercise per Week: Not on file    Minutes of Exercise per Session: Not on file   Stress:     Feeling of Stress : Not on file   Social Connections:     Frequency of Communication with Friends and Family: Not on file    Frequency of Social Gatherings with Friends and Family: Not on file    Attends Synagogue Services: Not on file    Active Member of [address] [place] [address] or Organizations: Not on file    Attends Club or Organization Meetings: Not on file    Marital Status: Not on file   Intimate Partner Violence:     Fear of Current or Ex-Partner: Not on file    Emotionally Abused: Not on file    Physically Abused: Not on file    Sexually Abused: Not on file   Housing Stability:     Unable to Pay for Housing in the Last Year: Not on file    Number of [place] in the Last Year: Not on file    Unstable Housing in the Last Year: Not on file         ALLERGIES: Aspirin, Codeine, Nsaids (non-steroidal anti-inflammatory drug), and Pcn [penicillins]    Review of Systems   All other systems reviewed and are negative. Vitals:    03/11/22 1510   BP: (!) 157/64   Pulse: 86   Resp: 18   Temp: 98.8 °F (37.1 °C)   SpO2: 98%   Weight: 64.6 kg (142 lb 6.7 oz)            Physical Exam  Vitals and nursing note reviewed. Constitutional:       Appearance: She is well-developed. HENT:      Head: Normocephalic. Eyes:      Conjunctiva/sclera: Conjunctivae normal.      Pupils: Pupils are equal, round, and reactive to light. Neck:      Trachea: No tracheal deviation. Cardiovascular:      Rate and Rhythm: Normal rate. Pulmonary:      Effort: Pulmonary effort is normal.   Abdominal:      General: There is no distension. Skin:     General: Skin is dry. Neurological:      Mental Status: She is alert. MDM       Procedures    Progress Note:  Results, treatment, and follow up plan have been discussed with patient/son.   Questions were answered. Agustin Sen MD  4:52 PM    Assessment/plan: Head injury on Plavix. Suspect mild concussion. Reassuring appearance/exam with stable vital signs. Head CT negative for acute process. Home with recommendations of rest, Tylenol. PCP follow-up as needed. Return precautions discussed.   Agustin Sen MD  4:53 PM

## 2022-03-11 NOTE — ED TRIAGE NOTES
Triage Note: Patient arrives to ER complaining of ground level fall [place] of [employer]. Patient states she tripped on a bump and fell hitting her head on the right side. Denies LOC. Taking Plavix.

## 2022-03-11 NOTE — ED NOTES
Verbal shift change report given to Charity Patel RN (oncoming nurse) by Vamshi Mao RN (offgoing nurse). Report included the following information SBAR.

## 2022-03-11 NOTE — ED NOTES
Discharge instructions reviewed with pt and pt's son. Discharge instructions given to pt per MD. Pt and pt's son able to return/verbalize discharge instructions. Copy of discharge instructions given. Pt condition stable, respiratory status within normal limits, neuro status intact. Pt out of ER via [facility], accompanied by son.

## 2022-03-18 PROBLEM — F41.9 ANXIETY: Status: ACTIVE | Noted: 2018-03-26

## 2022-03-18 PROBLEM — F32.A DEPRESSION: Status: ACTIVE | Noted: 2018-03-26

## 2022-03-20 PROBLEM — I69.319 CVA, OLD, COGNITIVE DEFICITS: Status: ACTIVE | Noted: 2018-03-27

## 2022-05-20 ENCOUNTER — TRANSCRIBE ORDER (OUTPATIENT)
Dept: SCHEDULING | Age: 62
End: 2022-05-20

## 2022-05-20 DIAGNOSIS — M79.671 RIGHT FOOT PAIN: ICD-10-CM

## 2022-05-20 DIAGNOSIS — M66.271 SPONTANEOUS RUPTURE OF EXTENSOR TENDONS, RIGHT ANKLE AND FOOT: Primary | ICD-10-CM

## 2022-05-27 ENCOUNTER — HOSPITAL ENCOUNTER (OUTPATIENT)
Dept: MRI IMAGING | Age: 62
Discharge: HOME | End: 2022-05-27
Attending: PODIATRIST
Payer: MEDICARE

## 2022-05-27 DIAGNOSIS — M79.671 RIGHT FOOT PAIN: ICD-10-CM

## 2022-05-27 DIAGNOSIS — M66.271 SPONTANEOUS RUPTURE OF EXTENSOR TENDONS, RIGHT ANKLE AND FOOT: ICD-10-CM

## 2022-05-27 PROCEDURE — 73721 MRI JNT OF LWR EXTRE W/O DYE: CPT

## 2022-08-11 NOTE — TELEPHONE ENCOUNTER
Spoke with patient. She stated she typically her Fycompa at night but is starting Ryan Naheed now and would like to know what time of day she should take it. Advised will discuss this with Dr. Alex Bond and will call patient back with her recommendations. She verbalized understanding. Verbally discussed with Dr. Alex Bond and recommended patient take Onfi at night. 227.2

## 2022-09-01 NOTE — TELEPHONE ENCOUNTER
Dr Kaden Conrad, I spoke directly with pt today (not caregiver)     Would you review her labs from 2 weeks ago and advise if this increase in seizure activity is due to her levels being \"off\", and what she needs to do? 160

## 2022-10-13 ENCOUNTER — NURSE TRIAGE (OUTPATIENT)
Dept: OTHER | Facility: CLINIC | Age: 62
End: 2022-10-13

## 2022-10-13 NOTE — TELEPHONE ENCOUNTER
Received call from Leanna at [facility], caller not on line. Complaint: diarrhea, worse in past 24 hours    Practice Name: [facility]    Caller's telephone number verified as [phone]    Connected with caller via phone, please see below triage    Location of patient: [place]    Subjective: Caller states \"Diarrhea going on for the past month and a half. \"     Current Symptoms: diarrhea, watery intermittent ; denies pain    Onset: 2 months ago; gradual, intermittent    Associated Symptoms: reduced activity    Pain Severity: 0/10; N/A; none    Temperature: Denies      What has been tried: immodium,     LMP: NA Pregnant: NA    Recommended disposition: See in Office Within 2 Weeks. Care advice provided, patient verbalizes understanding; denies any other questions or concerns; instructed to call back for any new or worsening symptoms. Patient/Caller agrees with recommended disposition; writer provided warm transfer to Charles Kaye at [facility] for appointment scheduling    Attention Provider: Thank you for allowing me to participate in the care of your patient. The patient was connected to triage in response to information provided to the [facility]. Please do not respond through this encounter as the response is not directed to a shared pool.     Reason for Disposition   Diarrhea is a chronic symptom (recurrent or ongoing AND lasting > 4 weeks)    Protocols used: Diarrhea-ADULT-[place]

## 2023-02-01 ENCOUNTER — TRANSCRIBE ORDER (OUTPATIENT)
Dept: SCHEDULING | Age: 63
End: 2023-02-01

## 2023-02-01 DIAGNOSIS — Z12.31 VISIT FOR SCREENING MAMMOGRAM: Primary | ICD-10-CM

## 2023-03-15 ENCOUNTER — HOSPITAL ENCOUNTER (OUTPATIENT)
Dept: MAMMOGRAPHY | Age: 63
Discharge: HOME | End: 2023-03-15
Attending: PHYSICIAN ASSISTANT
Payer: MEDICARE

## 2023-03-15 DIAGNOSIS — Z12.31 VISIT FOR SCREENING MAMMOGRAM: ICD-10-CM

## 2023-03-15 PROCEDURE — 77063 BREAST TOMOSYNTHESIS BI: CPT

## 2024-05-15 ENCOUNTER — TRANSCRIBE ORDERS (OUTPATIENT)
Facility: HOSPITAL | Age: 64
End: 2024-05-15

## 2024-05-15 DIAGNOSIS — Z12.31 SCREENING MAMMOGRAM FOR BREAST CANCER: Primary | ICD-10-CM

## 2024-05-21 ENCOUNTER — HOSPITAL ENCOUNTER (OUTPATIENT)
Facility: HOSPITAL | Age: 64
Discharge: HOME | End: 2024-05-24
Payer: MEDICARE

## 2024-05-21 DIAGNOSIS — Z12.31 SCREENING MAMMOGRAM FOR BREAST CANCER: ICD-10-CM

## 2024-05-21 PROCEDURE — 77063 BREAST TOMOSYNTHESIS BI: CPT

## 2025-05-05 ENCOUNTER — TRANSCRIBE ORDERS (OUTPATIENT)
Facility: HOSPITAL | Age: 65
End: 2025-05-05

## 2025-05-05 DIAGNOSIS — Z12.31 ENCOUNTER FOR SCREENING MAMMOGRAM FOR HIGH-RISK PATIENT: Primary | ICD-10-CM

## 2025-06-30 ENCOUNTER — HOSPITAL ENCOUNTER (OUTPATIENT)
Facility: HOSPITAL | Age: 65
Discharge: HOME | End: 2025-07-03
Payer: MEDICARE

## 2025-06-30 DIAGNOSIS — Z12.31 ENCOUNTER FOR SCREENING MAMMOGRAM FOR HIGH-RISK PATIENT: ICD-10-CM

## 2025-06-30 PROCEDURE — 77063 BREAST TOMOSYNTHESIS BI: CPT

## 2025-08-12 ENCOUNTER — TRANSCRIBE ORDERS (OUTPATIENT)
Facility: HOSPITAL | Age: 65
End: 2025-08-12

## 2025-08-12 DIAGNOSIS — N63.32 LUMP OF AXILLARY TAIL OF LEFT BREAST: Primary | ICD-10-CM
